# Patient Record
Sex: FEMALE | Race: OTHER | Employment: PART TIME | URBAN - METROPOLITAN AREA
[De-identification: names, ages, dates, MRNs, and addresses within clinical notes are randomized per-mention and may not be internally consistent; named-entity substitution may affect disease eponyms.]

---
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
## 2023-01-09 ENCOUNTER — LAB ENCOUNTER (OUTPATIENT)
Dept: LAB | Facility: HOSPITAL | Age: 23
End: 2023-01-09
Attending: PHYSICIAN ASSISTANT
Payer: COMMERCIAL

## 2023-01-09 PROCEDURE — 36415 COLL VENOUS BLD VENIPUNCTURE: CPT | Performed by: PHYSICIAN ASSISTANT

## 2023-01-09 PROCEDURE — 80053 COMPREHEN METABOLIC PANEL: CPT | Performed by: PHYSICIAN ASSISTANT

## 2023-01-09 PROCEDURE — 80178 ASSAY OF LITHIUM: CPT | Performed by: PHYSICIAN ASSISTANT

## 2025-04-30 ENCOUNTER — TELEPHONE (OUTPATIENT)
Age: 25
End: 2025-04-30

## 2025-04-30 NOTE — TELEPHONE ENCOUNTER
The [facility] Navigation team has attempted to reach you in order to follow up on a request to help get you connected with services. Please give us a call back at [phone] to discuss care coordination and resources.

## 2025-05-18 ENCOUNTER — TELEPHONE (OUTPATIENT)
Age: 25
End: 2025-05-18

## 2025-06-17 ENCOUNTER — ANESTHESIA EVENT (OUTPATIENT)
Dept: SURGERY | Facility: HOSPITAL | Age: 25
End: 2025-06-17
Payer: COMMERCIAL

## 2025-06-17 ENCOUNTER — ANESTHESIA (OUTPATIENT)
Dept: SURGERY | Facility: HOSPITAL | Age: 25
End: 2025-06-17
Payer: COMMERCIAL

## 2025-06-17 ENCOUNTER — HOSPITAL ENCOUNTER (OUTPATIENT)
Facility: HOSPITAL | Age: 25
Discharge: HOME | End: 2025-06-18
Attending: SURGERY | Admitting: SURGERY
Payer: COMMERCIAL

## 2025-06-17 PROBLEM — G89.18 POST-OP PAIN: Status: ACTIVE | Noted: 2025-06-17

## 2025-06-17 LAB — B-HCG UR QL: NEGATIVE

## 2025-06-17 PROCEDURE — 81025 URINE PREGNANCY TEST: CPT

## 2025-06-17 PROCEDURE — 76942 ECHO GUIDE FOR BIOPSY: CPT | Performed by: STUDENT IN AN ORGANIZED HEALTH CARE EDUCATION/TRAINING PROGRAM

## 2025-06-17 PROCEDURE — 88307 TISSUE EXAM BY PATHOLOGIST: CPT | Performed by: SURGERY

## 2025-06-17 DEVICE — BREAST TISSUE EXPANDER, SUTURE TABS, INTEGRAL INJECTION DOME, 350CC
Type: IMPLANTABLE DEVICE | Site: BREAST | Status: FUNCTIONAL
Brand: MENTOR ARTOURA PLUS, SMOOTH, ULTRA HIGH PROFILE

## 2025-06-17 DEVICE — GRAFT HUM TISS THK2-2.8MM THCK M PERF CNTOUR 9.6 X 19.3 CM PC SZ 132 CM: Type: IMPLANTABLE DEVICE | Site: BREAST | Status: FUNCTIONAL

## 2025-06-17 RX ORDER — ATOMOXETINE 60 MG/1
60 CAPSULE ORAL DAILY
Status: DISCONTINUED | OUTPATIENT
Start: 2025-06-18 | End: 2025-06-18

## 2025-06-17 RX ORDER — DROSPIRENONE AND ETHINYL ESTRADIOL 0.02-3(28)
1 KIT ORAL NIGHTLY
Status: DISCONTINUED | OUTPATIENT
Start: 2025-06-17 | End: 2025-06-18

## 2025-06-17 RX ORDER — SCOPOLAMINE 1 MG/3D
PATCH, EXTENDED RELEASE TRANSDERMAL
Status: DISCONTINUED
Start: 2025-06-17 | End: 2025-06-18

## 2025-06-17 RX ORDER — SODIUM CHLORIDE, SODIUM LACTATE, POTASSIUM CHLORIDE, CALCIUM CHLORIDE 600; 310; 30; 20 MG/100ML; MG/100ML; MG/100ML; MG/100ML
INJECTION, SOLUTION INTRAVENOUS CONTINUOUS
Status: DISCONTINUED | OUTPATIENT
Start: 2025-06-17 | End: 2025-06-17 | Stop reason: HOSPADM

## 2025-06-17 RX ORDER — HYDROMORPHONE HYDROCHLORIDE 1 MG/ML
INJECTION, SOLUTION INTRAMUSCULAR; INTRAVENOUS; SUBCUTANEOUS
Status: DISCONTINUED
Start: 2025-06-17 | End: 2025-06-17 | Stop reason: WASHOUT

## 2025-06-17 RX ORDER — PROCHLORPERAZINE EDISYLATE 5 MG/ML
5 INJECTION INTRAMUSCULAR; INTRAVENOUS EVERY 8 HOURS PRN
Status: DISCONTINUED | OUTPATIENT
Start: 2025-06-17 | End: 2025-06-17 | Stop reason: HOSPADM

## 2025-06-17 RX ORDER — ONDANSETRON 2 MG/ML
INJECTION INTRAMUSCULAR; INTRAVENOUS AS NEEDED
Status: DISCONTINUED | OUTPATIENT
Start: 2025-06-17 | End: 2025-06-17 | Stop reason: SURG

## 2025-06-17 RX ORDER — ACETAMINOPHEN 500 MG
1000 TABLET ORAL ONCE
Status: DISCONTINUED | OUTPATIENT
Start: 2025-06-17 | End: 2025-06-17 | Stop reason: HOSPADM

## 2025-06-17 RX ORDER — PHENYLEPHRINE HCL 10 MG/ML
VIAL (ML) INJECTION AS NEEDED
Status: DISCONTINUED | OUTPATIENT
Start: 2025-06-17 | End: 2025-06-17 | Stop reason: SURG

## 2025-06-17 RX ORDER — SCOPOLAMINE 1 MG/3D
1 PATCH, EXTENDED RELEASE TRANSDERMAL ONCE
Status: DISCONTINUED | OUTPATIENT
Start: 2025-06-17 | End: 2025-06-18

## 2025-06-17 RX ORDER — MORPHINE SULFATE 2 MG/ML
2 INJECTION, SOLUTION INTRAMUSCULAR; INTRAVENOUS EVERY 2 HOUR PRN
Status: DISCONTINUED | OUTPATIENT
Start: 2025-06-17 | End: 2025-06-18

## 2025-06-17 RX ORDER — HYDROMORPHONE HYDROCHLORIDE 1 MG/ML
0.6 INJECTION, SOLUTION INTRAMUSCULAR; INTRAVENOUS; SUBCUTANEOUS EVERY 5 MIN PRN
Status: DISCONTINUED | OUTPATIENT
Start: 2025-06-17 | End: 2025-06-17 | Stop reason: HOSPADM

## 2025-06-17 RX ORDER — METHOCARBAMOL 500 MG/1
500 TABLET, FILM COATED ORAL EVERY 12 HOURS
Status: DISCONTINUED | OUTPATIENT
Start: 2025-06-18 | End: 2025-06-18

## 2025-06-17 RX ORDER — LITHIUM CARBONATE 300 MG/1
600 TABLET, FILM COATED, EXTENDED RELEASE ORAL NIGHTLY
Status: DISCONTINUED | OUTPATIENT
Start: 2025-06-18 | End: 2025-06-17

## 2025-06-17 RX ORDER — HYDROCODONE BITARTRATE AND ACETAMINOPHEN 5; 325 MG/1; MG/1
1 TABLET ORAL EVERY 4 HOURS PRN
Status: DISCONTINUED | OUTPATIENT
Start: 2025-06-17 | End: 2025-06-18

## 2025-06-17 RX ORDER — ROCURONIUM BROMIDE 10 MG/ML
INJECTION, SOLUTION INTRAVENOUS AS NEEDED
Status: DISCONTINUED | OUTPATIENT
Start: 2025-06-17 | End: 2025-06-17 | Stop reason: SURG

## 2025-06-17 RX ORDER — ONDANSETRON 2 MG/ML
4 INJECTION INTRAMUSCULAR; INTRAVENOUS EVERY 6 HOURS PRN
Status: DISCONTINUED | OUTPATIENT
Start: 2025-06-17 | End: 2025-06-17 | Stop reason: HOSPADM

## 2025-06-17 RX ORDER — HYDROCODONE BITARTRATE AND ACETAMINOPHEN 5; 325 MG/1; MG/1
1 TABLET ORAL ONCE AS NEEDED
Status: DISCONTINUED | OUTPATIENT
Start: 2025-06-17 | End: 2025-06-17 | Stop reason: HOSPADM

## 2025-06-17 RX ORDER — LITHIUM CARBONATE 300 MG/1
600 TABLET, FILM COATED, EXTENDED RELEASE ORAL NIGHTLY
Status: DISCONTINUED | OUTPATIENT
Start: 2025-06-17 | End: 2025-06-18

## 2025-06-17 RX ORDER — DEXAMETHASONE SODIUM PHOSPHATE 4 MG/ML
VIAL (ML) INJECTION AS NEEDED
Status: DISCONTINUED | OUTPATIENT
Start: 2025-06-17 | End: 2025-06-17 | Stop reason: SURG

## 2025-06-17 RX ORDER — ACETAMINOPHEN 500 MG
1000 TABLET ORAL ONCE AS NEEDED
Status: DISCONTINUED | OUTPATIENT
Start: 2025-06-17 | End: 2025-06-17 | Stop reason: HOSPADM

## 2025-06-17 RX ORDER — MORPHINE SULFATE 4 MG/ML
4 INJECTION, SOLUTION INTRAMUSCULAR; INTRAVENOUS EVERY 2 HOUR PRN
Status: DISCONTINUED | OUTPATIENT
Start: 2025-06-17 | End: 2025-06-18

## 2025-06-17 RX ORDER — HYDROCODONE BITARTRATE AND ACETAMINOPHEN 5; 325 MG/1; MG/1
2 TABLET ORAL EVERY 4 HOURS PRN
Status: DISCONTINUED | OUTPATIENT
Start: 2025-06-17 | End: 2025-06-18

## 2025-06-17 RX ORDER — TRANEXAMIC ACID 10 MG/ML
INJECTION, SOLUTION INTRAVENOUS AS NEEDED
Status: DISCONTINUED | OUTPATIENT
Start: 2025-06-17 | End: 2025-06-17 | Stop reason: SURG

## 2025-06-17 RX ORDER — SODIUM CHLORIDE, SODIUM LACTATE, POTASSIUM CHLORIDE, CALCIUM CHLORIDE 600; 310; 30; 20 MG/100ML; MG/100ML; MG/100ML; MG/100ML
INJECTION, SOLUTION INTRAVENOUS CONTINUOUS
Status: DISCONTINUED | OUTPATIENT
Start: 2025-06-17 | End: 2025-06-18

## 2025-06-17 RX ORDER — METHOCARBAMOL 100 MG/ML
500 INJECTION, SOLUTION INTRAMUSCULAR; INTRAVENOUS EVERY 12 HOURS
Status: DISCONTINUED | OUTPATIENT
Start: 2025-06-17 | End: 2025-06-18

## 2025-06-17 RX ORDER — DULOXETIN HYDROCHLORIDE 30 MG/1
30 CAPSULE, DELAYED RELEASE ORAL DAILY
Status: DISCONTINUED | OUTPATIENT
Start: 2025-06-18 | End: 2025-06-17

## 2025-06-17 RX ORDER — HYDROMORPHONE HYDROCHLORIDE 1 MG/ML
0.4 INJECTION, SOLUTION INTRAMUSCULAR; INTRAVENOUS; SUBCUTANEOUS EVERY 5 MIN PRN
Status: DISCONTINUED | OUTPATIENT
Start: 2025-06-17 | End: 2025-06-17 | Stop reason: HOSPADM

## 2025-06-17 RX ORDER — MORPHINE SULFATE 2 MG/ML
1 INJECTION, SOLUTION INTRAMUSCULAR; INTRAVENOUS EVERY 2 HOUR PRN
Status: DISCONTINUED | OUTPATIENT
Start: 2025-06-17 | End: 2025-06-18

## 2025-06-17 RX ORDER — DULOXETIN HYDROCHLORIDE 30 MG/1
60 CAPSULE, DELAYED RELEASE ORAL DAILY
Status: DISCONTINUED | OUTPATIENT
Start: 2025-06-17 | End: 2025-06-18

## 2025-06-17 RX ORDER — DULOXETIN HYDROCHLORIDE 30 MG/1
30 CAPSULE, DELAYED RELEASE ORAL DAILY
Status: DISCONTINUED | OUTPATIENT
Start: 2025-06-17 | End: 2025-06-18

## 2025-06-17 RX ORDER — HYDROMORPHONE HYDROCHLORIDE 1 MG/ML
0.2 INJECTION, SOLUTION INTRAMUSCULAR; INTRAVENOUS; SUBCUTANEOUS EVERY 5 MIN PRN
Status: DISCONTINUED | OUTPATIENT
Start: 2025-06-17 | End: 2025-06-17 | Stop reason: HOSPADM

## 2025-06-17 RX ORDER — HYDROMORPHONE HYDROCHLORIDE 1 MG/ML
0.6 INJECTION, SOLUTION INTRAMUSCULAR; INTRAVENOUS; SUBCUTANEOUS EVERY 5 MIN PRN
Status: ACTIVE | OUTPATIENT
Start: 2025-06-17 | End: 2025-06-18

## 2025-06-17 RX ORDER — DULOXETIN HYDROCHLORIDE 30 MG/1
60 CAPSULE, DELAYED RELEASE ORAL DAILY
Status: DISCONTINUED | OUTPATIENT
Start: 2025-06-18 | End: 2025-06-17

## 2025-06-17 RX ORDER — NALOXONE HYDROCHLORIDE 0.4 MG/ML
0.08 INJECTION, SOLUTION INTRAMUSCULAR; INTRAVENOUS; SUBCUTANEOUS AS NEEDED
Status: ACTIVE | OUTPATIENT
Start: 2025-06-17 | End: 2025-06-18

## 2025-06-17 RX ORDER — METHOCARBAMOL 100 MG/ML
INJECTION, SOLUTION INTRAMUSCULAR; INTRAVENOUS
Status: COMPLETED
Start: 2025-06-17 | End: 2025-06-17

## 2025-06-17 RX ORDER — FLUTICASONE PROPIONATE 50 MCG
2 SPRAY, SUSPENSION (ML) NASAL DAILY
Status: DISCONTINUED | OUTPATIENT
Start: 2025-06-17 | End: 2025-06-17

## 2025-06-17 RX ORDER — ACETAMINOPHEN 325 MG/1
650 TABLET ORAL EVERY 4 HOURS PRN
Status: DISCONTINUED | OUTPATIENT
Start: 2025-06-17 | End: 2025-06-18

## 2025-06-17 RX ORDER — HYDROMORPHONE HYDROCHLORIDE 1 MG/ML
0.2 INJECTION, SOLUTION INTRAMUSCULAR; INTRAVENOUS; SUBCUTANEOUS EVERY 5 MIN PRN
Status: ACTIVE | OUTPATIENT
Start: 2025-06-17 | End: 2025-06-18

## 2025-06-17 RX ORDER — HYDROCODONE BITARTRATE AND ACETAMINOPHEN 5; 325 MG/1; MG/1
2 TABLET ORAL ONCE AS NEEDED
Status: DISCONTINUED | OUTPATIENT
Start: 2025-06-17 | End: 2025-06-17 | Stop reason: HOSPADM

## 2025-06-17 RX ORDER — NALOXONE HYDROCHLORIDE 0.4 MG/ML
0.08 INJECTION, SOLUTION INTRAMUSCULAR; INTRAVENOUS; SUBCUTANEOUS AS NEEDED
Status: DISCONTINUED | OUTPATIENT
Start: 2025-06-17 | End: 2025-06-17 | Stop reason: HOSPADM

## 2025-06-17 RX ORDER — DIAZEPAM 10 MG/2ML
2.5 INJECTION, SOLUTION INTRAMUSCULAR; INTRAVENOUS ONCE
Status: DISCONTINUED | OUTPATIENT
Start: 2025-06-17 | End: 2025-06-17 | Stop reason: HOSPADM

## 2025-06-17 RX ORDER — CHOLECALCIFEROL (VITAMIN D3) 125 MCG
3000 CAPSULE ORAL
Status: DISCONTINUED | OUTPATIENT
Start: 2025-06-17 | End: 2025-06-18

## 2025-06-17 RX ORDER — HYDROMORPHONE HYDROCHLORIDE 1 MG/ML
0.4 INJECTION, SOLUTION INTRAMUSCULAR; INTRAVENOUS; SUBCUTANEOUS EVERY 5 MIN PRN
Status: ACTIVE | OUTPATIENT
Start: 2025-06-17 | End: 2025-06-18

## 2025-06-17 RX ADMIN — ONDANSETRON 4 MG: 2 INJECTION INTRAMUSCULAR; INTRAVENOUS at 15:40:00

## 2025-06-17 RX ADMIN — ROCURONIUM BROMIDE 50 MG: 10 INJECTION, SOLUTION INTRAVENOUS at 13:20:00

## 2025-06-17 RX ADMIN — TRANEXAMIC ACID 1000 MG: 10 INJECTION, SOLUTION INTRAVENOUS at 14:58:00

## 2025-06-17 RX ADMIN — ROCURONIUM BROMIDE 10 MG: 10 INJECTION, SOLUTION INTRAVENOUS at 14:13:00

## 2025-06-17 RX ADMIN — DEXAMETHASONE SODIUM PHOSPHATE 8 MG: 4 MG/ML VIAL (ML) INJECTION at 13:22:00

## 2025-06-17 RX ADMIN — SODIUM CHLORIDE, SODIUM LACTATE, POTASSIUM CHLORIDE, CALCIUM CHLORIDE: 600; 310; 30; 20 INJECTION, SOLUTION INTRAVENOUS at 16:12:00

## 2025-06-17 RX ADMIN — PHENYLEPHRINE HCL 100 MCG: 10 MG/ML VIAL (ML) INJECTION at 14:41:00

## 2025-06-17 RX ADMIN — ROCURONIUM BROMIDE 10 MG: 10 INJECTION, SOLUTION INTRAVENOUS at 15:05:00

## 2025-06-17 NOTE — BRIEF OP NOTE
Pre-Operative Diagnosis: BRCA POSITIVE     Post-Operative Diagnosis: BRCA POSITIVE      Procedure Performed:   BILATERAL NIPPLE SPARING MASTECTOMY ([place])  B TE reconstruction  Surgeons and Role:  Panel 1:     * Bobby Loredo MD - Primary  Panel 2:     * Luz Marina Shaw MD - Primary    Assistant(s):  Surgical Assistant.: April Lambert CSA  PA: Lissette Morel PA        Estimated Blood Loss: Blood Output: 20 mL (6/17/2025  3:53 PM)    Luz Marina Shaw MD  6/17/2025  3:56 PM

## 2025-06-17 NOTE — ANESTHESIA PROCEDURE NOTES
Airway  Date/Time: 6/17/2025 1:21 PM  Reason: elective    Airway not difficult    General Information and Staff   Patient location during procedure: OR  Anesthesiologist: Kavin Spencer DO  Performed: anesthesiologist   Performed by: Kavin Spencer DO  Authorized by: Kavin Spencer DO        Indications and Patient Condition  Indications for airway management: anesthesia  Sedation level: deep      Preoxygenated: yesPatient position: sniffing    Mask difficulty assessment: 1 - vent by mask    Final Airway Details    Final airway type: endotracheal airway    Successful airway: ETT  Cuffed: yes   Successful intubation technique: direct laryngoscopy  Endotracheal tube insertion site: oral  Blade: Adam  Blade size: #3  ETT size (mm): 7.5    Cormack-Lehane Classification: grade I - full view of glottis  Placement verified by: capnometry   Measured from: lips  ETT to lips (cm): 21  Number of attempts at approach: 1  Ventilation between attempts: none  Number of other approaches attempted: 0    Additional Comments  Dentition, lips, gums intact as preinduction

## 2025-06-17 NOTE — ANESTHESIA PREPROCEDURE EVALUATION
PRE-OP EVALUATION    Patient Name: Nellie Viramontes    Admit Diagnosis: BRCA POSITIVE    Pre-op Diagnosis: BRCA POSITIVE    BILATERAL NIPPLE SPARING MASTECTOMY ([place]) BILATERAL SUBPECTORAL TISSUE EXPANDER PLACEMENT WITH BILATERAL PLACEMENT OF ALLODERM (ERINN)    Anesthesia Procedure: BILATERAL NIPPLE SPARING MASTECTOMY ([place]) BILATERAL SUBPECTORAL TISSUE EXPANDER PLACEMENT WITH BILATERAL PLACEMENT OF ALLODERM (POLYGISELLE) (Bilateral)  .    Surgeons and Role:  Panel 1:     * Bobby Loredo MD - Primary  Panel 2:     * Luz Marina Shaw MD - Primary    Pre-op vitals reviewed.  Temp: 97.9 °F (36.6 °C)  Pulse: 86  Resp: 16  BP: 94/70  SpO2: 100 %  Body mass index is 19.02 kg/m².    Current medications reviewed.  Hospital Medications:  Current Medications[1]    Outpatient Medications:   Prescriptions Prior to Admission[2]    Allergies: Seasonal      Anesthesia Evaluation    Patient summary reviewed.    Anesthetic Complications  (-) history of anesthetic complications         GI/Hepatic/Renal    Negative GI/hepatic/renal ROS.                             Cardiovascular    Negative cardiovascular ROS.  ECG reviewed.  Exercise tolerance: good     MET: >4                                           Endo/Other    Negative endo/other ROS.                              Pulmonary    Negative pulmonary ROS.                       Neuro/Psych  Comment: PTSD  Negative neuro/psych ROS.  (+) depression  (+) anxiety                      Scoliosis           Past Surgical History[3]  Social Hx on file[4]  History   Drug Use No     Available pre-op labs reviewed.               Airway      Mallampati: II  Mouth opening: >3 FB  TM distance: 4 - 6 cm  Neck ROM: full Cardiovascular    Cardiovascular exam normal.  Rhythm: regular  Rate: normal  (-) murmur   Dental    Dentition appears grossly intact         Pulmonary    Pulmonary exam normal.  Breath sounds clear to auscultation bilaterally.               Other  findings              ASA: 3   Plan: general  NPO status verified and patient meets guidelines.  Patient has not taken beta blockers in last 24 hours.  Post-procedure pain management plan discussed with surgeon and patient.      Plan/risks discussed with: patient                Present on Admission:  **None**             [1]    [Transfer Hold] acetaminophen (Tylenol Extra Strength) tab 1,000 mg  1,000 mg Oral Once    scopolamine (Transderm-Scop) 1 MG/3DAYS patch 1 patch  1 patch Transdermal Once    lactated ringers infusion   Intravenous Continuous    ceFAZolin (Ancef) 2g in 10mL IV syringe premix  2 g Intravenous Once    ceFAZolin (Ancef) 2 g/10mL IV syringe premix        ceFAZolin (Ancef) 1 g, gentamicin (Garamycin) 80 mg in sodium chloride 0.9% 1,000 mL irrigation   Irrigation Once (Intra-Op)   [2]   Medications Prior to Admission   Medication Sig Dispense Refill Last Dose/Taking    GABAPENTIN 300 MG Oral Cap MAY TAKE 2 CAPSULES (600 MG TOTAL) BY MOUTH NIGHTLY AS NEEDED. MAY ALSO TAKE 1 CAPSULE (300 MG TOTAL) DAILY AS NEEDED. 90 capsule 0 6/16/2025 at 11:30 PM    DULoxetine 30 MG Oral Cap DR Particles TAKE 30 MG TABLET ALONG WITH 60 MG FOR 90 MG TOTAL DAILY 90 capsule 0 6/16/2025 at 11:30 PM    [START ON 7/1/2025] atomoxetine 60 MG Oral Cap Take 1 capsule (60 mg total) by mouth daily. 90 capsule 0 6/15/2025    [START ON 7/1/2025] traZODone 150 MG Oral Tab Take 1 tablet (150 mg total) by mouth daily as needed for Sleep. (Patient taking differently: Take 1 tablet (150 mg total) by mouth nightly.) 90 tablet 0 6/16/2025 at 11:30 PM    lithium  MG Oral Tab CR Take 2 tablets (600 mg total) by mouth nightly. 60 tablet 2 6/16/2025 at 11:30 PM    DULoxetine 60 MG Oral Cap DR Particles TAKE 1 CAPSULE BY MOUTH EVERY DAY (Patient taking differently: every evening. TAKE 1 CAPSULE BY MOUTH EVERY DAY) 90 capsule 1 6/16/2025 at 11:30 PM    Norethindrone Acet-Ethinyl Est (JUNEL 1/20) 1-20 MG-MCG Oral Tab Take 1 tablet by  mouth nightly. 21 tablet 14 6/16/2025 at 11:30 PM    FLUTICASONE PROPIONATE 50 MCG/ACT Nasal Suspension USE 2 SPRAYS IN EACH NOSTRIL DAILY 48 g 3 Taking    lactase 3000 units Oral Tab Take 1 tablet (3,000 Units total) by mouth daily as needed. As needed with meals and snacks containing dairy/lactose       Cholecalciferol (VITAMIN D) 1000 units Oral Tab Take 5,000 mg by mouth.       Multiple Vitamins-Minerals (MULTIVITAMINS) Oral Chew Tab Chew 1 tablet by mouth in the morning.      [3]   Past Surgical History:  Procedure Laterality Date    Eye surgery  06/2008    lazy eye muscle repair B/L eye  AND IN 2020- had x2 stye's removed    Needle biopsy right      Other surgical history      Revise ulnar nerve at elbow Right 07/09/2019   [4]   Social History  Socioeconomic History    Marital status: Single   Tobacco Use    Smoking status: Never    Smokeless tobacco: Never   Vaping Use    Vaping status: Never Used   Substance and Sexual Activity    Alcohol use: Yes     Comment: socially    Drug use: No

## 2025-06-17 NOTE — PROGRESS NOTES
NURSING ADMISSION NOTE      Patient admitted via BED from PACU  Oriented to room.  Safety precautions initiated.  Bed in low position.  Call light in reach.    Alert & oriented x4.With tolerable pain.No nausea or vomiting.With right and left breast incisions with steristrips,fluffs ,surgical bra --all c/d/I.With 2 sahara chapman drains on each breast to bulb suction with scant serosanguinous drainage..Due to void.HOB elevated at 30 degrees .Arms elevated on pillows.Plan of care discussed with patient.all questions and concerns addressed.     2 person skin assessment done with Azra WILCOX -skin intact.

## 2025-06-17 NOTE — ANESTHESIA POSTPROCEDURE EVALUATION
[facility]    Nellie Viramontes Patient Status:  Hospital Outpatient Surgery   Age/Gender 24 year old female MRN [id number]   Location [facility] Attending Bobby Loredo MD   Hosp Day # 0 PCP Jr Barron MD       Anesthesia Post-op Note    BILATERAL NIPPLE SPARING MASTECTOMY ([place])    Procedure Summary       Date: 06/17/25 Room / Location:  MAIN OR 12 /  MAIN OR    Anesthesia Start: 1309 Anesthesia Stop:     Procedures:       BILATERAL NIPPLE SPARING MASTECTOMY ([place]) (Bilateral: Breast)      BILATERAL PREPECTORAL TISSUE EXPANDER PLACEMENT WITH BILATERAL PLACEMENT OF ALLODERM (ERINN) (Bilateral: Breast) Diagnosis: (BRCA POSITIVE)    Surgeons: Bobby Loredo MD; Luz Marina Shaw MD Anesthesiologist: Kavin Spencer DO    Anesthesia Type: general, regional ASA Status: 3            Anesthesia Type: general, regional    Vitals Value Taken Time   /62 06/17/25 16:10   Temp  06/17/25 16:12   Pulse 89 06/17/25 16:11   Resp 15 06/17/25 16:11   SpO2 99 % 06/17/25 16:11   Vitals shown include unfiled device data.        Patient Location: PACU    Anesthesia Type: general and regional    Airway Patency: patent    Postop Pain Control: adequate    Mental Status: mildly sedated but able to meaningfully participate in the post-anesthesia evaluation    Nausea/Vomiting: none    Cardiopulmonary/Hydration status: stable euvolemic    Complications: no apparent anesthesia related complications    Postop vital signs: stable    Dental Exam: Unchanged from Preop    Patient to be discharged from PACU when criteria met.

## 2025-06-17 NOTE — ANESTHESIA PROCEDURE NOTES
Regional Block    Performed by: Kavin Spencer DO  Authorized by: Kavin Spencer DO      General Information and Staff    Start Time:   Anesthesiologist:  Kavin Spencer DO  Performed by:  Anesthesiologist  Patient Location:  OR      Site Identification: real time ultrasound guided and image stored and retrievable    Block site/laterality marked before start: site marked  Reason for Block: at surgeon's request and post-op pain management    Preanesthetic Checklist: 2 patient identifers, IV checked, risks and benefits discussed, monitors and equipment checked, pre-op evaluation, timeout performed, anesthesia consent, sterile technique used, no prohibitive neurological deficits and no local skin infection at insertion site      Procedure Details    Patient Position:   Prep: ChloraPrep    Monitoring:  Cardiac monitor, continuous pulse ox, blood pressure cuff and heart rate  Block Type:  PEC2 and PEC1  Laterality:  Bilateral  Injection Technique:  Single-shot    Needle    Needle Type:  Short-bevel and echogenic  Needle Localization:  Ultrasound guidance  Reason for Ultrasound Use: appropriate spread of the medication was noted in real time and no ultrasound evidence of intravascular and/or intraneural injection            Assessment    Injection Assessment:  Good spread noted, negative resistance, negative aspiration for heme, incremental injection and low pressure  Heart Rate Change: No    - Patient tolerated block procedure well without evidence of immediate block related complications.     Medications      Additional Comments    Medication:  Bupivacaine 0.25% 10 ml on each side with 2mg PF dexamethasone for PEC 1 and 15 ml on each side for PEC 2

## 2025-06-17 NOTE — H&P
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  She recently underwent a right ultrasound guided core biopsy which reveals a fibroadenoma  She has multiple family members with breast cancer, youngest aged 25  She is interested in bilateral prophylactic mastectomies  Mother present for exam and discussion     Past Medical History:   Diagnosis Date   Anorexia nervosa   Anxiety   BRCA1 positive   BRCA2 positive   Depression   Eczema   OCD (obsessive compulsive disorder)   PTSD (post-traumatic stress disorder)   Scoliosis     Past Surgical History:   Procedure Laterality Date   EYE SURGERY Bilateral 06/2008   lazy eye muscle repair   NEUROPLASTY &/TRANSPOSITION ULNAR NERVE ELBOW Right 07/09/2019     Current Outpatient Medications   Medication Sig Dispense Refill   gabapentin 300 MG Oral Cap Take 600 mg by mouth 3 (three) times daily as needed.   VESTURA 3-0.02 MG Oral Tab Take 1 tablet by mouth daily. 90 tablet 3   cephalexin 500 MG Oral Cap Take 1 capsule (500 mg total) by mouth in the morning, at noon, and at bedtime. 21 capsule 0   atomoxetine 25 MG Oral Cap 25 mg.   phenazopyridine (PYRIDIUM) 200 MG Oral Tab Take 1 tablet (200 mg total) by mouth 3 (three) times daily. after meals 6 tablet 0   DULoxetine 60 MG Oral Cap DR Particles Take 60 mg by mouth daily.   traZODone 100 MG Oral Tab Take 150 mg by mouth nightly as needed. Pt take 150 mg   clonazePAM 0.5 MG Oral Tab Take 0.25-0.5 mg by mouth daily as needed.   Lithium Carbonate  MG Oral Tab CR Take 2 tablets (600 mg total) by mouth nightly. 60 tablet 1   FLUTICASONE PROPIONATE 50 MCG/ACT Nasal Suspension USE 2 SPRAYS IN EACH NOSTRIL DAILY 48 g 3   lactase 3000 units Oral Tab Take 3,000 Units by mouth daily as needed. As needed with meals and snacks containing dairy/lactose   Multiple Vitamins-Minerals (MULTIVITAMINS) Oral Chew Tab Chew 1 tablet by mouth daily.     Seasonal OTHER (SEE  COMMENTS)  Comment:Headache, runny nose, scratchy throat    Family History   Problem Relation Age of Onset   Diabetes Father   No Known Problems Mother   Diabetes Maternal Grandmother   Hypertension Maternal Grandmother   Diabetes Maternal Grandfather   Cancer Maternal Aunt   ovarian   Ovarian Cancer Maternal Aunt 44   Breast Cancer Maternal Aunt 50   Breast Cancer Maternal Aunt 49   Other (Other) Maternal Uncle   scoliosis   Ovarian Cancer Maternal Cousin Female 25   Ovarian Cancer Maternal Cousin Female 25   Breast Cancer Maternal Cousin Female 25   Breast Cancer Maternal Cousin Female 36   Ovarian Cancer Paternal Great-Grandparent   rheumatoid arthritis     Social History  Tobacco Use  Smoking status: Never  Smokeless tobacco: Never  Vaping Use  Vaping status: Never Used  Alcohol use: No  Alcohol/week: 0.0 standard drinks of alcohol  Drug use: No    ROS:    10 point review performed with pertinent positives and negatives per HPI    EXAM:    GENERAL: well developed, well nourished female, in no apparent distress  SKIN: anicteric  HEENT: normocephalic; sclera anicteric  NECK: supple, no JVD  RESPIRATORY: clear to auscultation  CARDIOVASCULAR: RRR  ABDOMEN: normal active BS, soft and no tenderness, no mass  LYMPHATIC: no lymphadenopathy  EXTREMITIES: no cyanosis or edema  BREASTS: no discrete or dominate mass either breast  Axilla negative    BREAST US BIOPSY 1 SITE (CPT=19083)    Addendum Date: 4/4/2025   DATE OF SERVICE: 04.03.2025 PATHOLOGY ADDENDUM Addendum, 4/4/2025 PATHOLOGY: Final pathology of right breast mass states fibroadenoma. This is concordant with imaging findings. RECOMMENDATION: Recommend patient return to high risk screening. Radiology nursing has notified the patient of the results recommendations. PATHOLOGY ADDENDUM ENDS    Result Date: 4/4/2025  DATE OF SERVICE: 04.03.2025 PROCEDURE 4/3/2025 9:56 AM 1. Biopsy of the right breast, percutaneous, automated core needle biopsy, using imaging  guidance. 2. Ultrasound guidance for biopsy, imaging supervision and interpretation. 3. Image guided placement, metallic localization clip, percutaneous, during breast biopsy. 4. Post clip right digital diagnostic mammogram INDICATION: 24 year-old woman with right breast indeterminate mass presents for ultrasound guided core needle biopsy. DESCRIPTION: Written, informed consent was obtained, including discussion of the risks, benefits and alternatives. A time out was performed to verify correct patient and procedure. * Target: Right breast mass 1:00 position 3 cm from the nipple upper inner quadrant. * Approach: Medial. * Anesthesia: Lidocaine without epinephrine. * Needle: 12 G Bard Marquee. * Sample number: 3. * Marker: X. * Marker migration on post-procedure mammogram: Breast Density D-The breasts are extremely dense, which lowers the sensitivity of mammography. Although, due to the posterior medial aspect this is not demonstrated on the CC view. There is no evidence of clip migration. . Specimen was sent to surgical pathology. The patient tolerated the procedure without complication and was given verbal and written instructions regarding post-procedure wound care.     IMPRESSION: 1. Successful right breast ultrasound guided, automated core needle biopsy. 2. Successful X shaped marker clip placement with confirmation by right digital diagnostic mammogram. 3. See addendum for radiology pathology correlation. BI-RADS Final Assessment Category: Post Procedure Mammograms for Marker Placement. Management Recommendation: Assess radiologic/pathologic concordance. [facility] [place], [address]     DIAG MAMM,DIGITAL,UNI RT CLIP [facility] (CPT=77065)    Addendum Date: 4/4/2025   DATE OF SERVICE: 04.03.2025 PATHOLOGY ADDENDUM Addendum, 4/4/2025 PATHOLOGY: Final pathology of right breast mass states fibroadenoma. This is concordant with imaging findings. RECOMMENDATION: Recommend patient return to high risk  screening. Radiology nursing has notified the patient of the results recommendations. PATHOLOGY ADDENDUM ENDS    Result Date: 4/4/2025  DATE OF SERVICE: 04.03.2025 PROCEDURE 4/3/2025 9:56 AM 1. Biopsy of the right breast, percutaneous, automated core needle biopsy, using imaging guidance. 2. Ultrasound guidance for biopsy, imaging supervision and interpretation. 3. Image guided placement, metallic localization clip, percutaneous, during breast biopsy. 4. Post clip right digital diagnostic mammogram INDICATION: 24 year-old woman with right breast indeterminate mass presents for ultrasound guided core needle biopsy. DESCRIPTION: Written, informed consent was obtained, including discussion of the risks, benefits and alternatives. A time out was performed to verify correct patient and procedure. * Target: Right breast mass 1:00 position 3 cm from the nipple upper inner quadrant. * Approach: Medial. * Anesthesia: Lidocaine without epinephrine. * Needle: 12 G Bard Marquee. * Sample number: 3. * Marker: X. * Marker migration on post-procedure mammogram: Breast Density D-The breasts are extremely dense, which lowers the sensitivity of mammography. Although, due to the posterior medial aspect this is not demonstrated on the CC view. There is no evidence of clip migration. . Specimen was sent to surgical pathology. The patient tolerated the procedure without complication and was given verbal and written instructions regarding post-procedure wound care.     IMPRESSION: 1. Successful right breast ultrasound guided, automated core needle biopsy. 2. Successful X shaped marker clip placement with confirmation by right digital diagnostic mammogram. 3. See addendum for radiology pathology correlation. BI-RADS Final Assessment Category: Post Procedure Mammograms for Marker Placement. Management Recommendation: Assess radiologic/pathologic concordance. [facility] [address] [place], [address]     US BREAST BILATERAL LIMITED  (CPT=76642X2)    Result Date: 3/27/2025  DATE OF SERVICE: 03.27.2025 LIMITED BREAST ULTRASOUND CLINICAL INFORMATION: Follow-up of a probably benign right breast mass at 1:00.. Further evaluation of left breast pain. Patient is positive for the BRCA gene mutation. COMPARISON STUDY: Priors dating back to 2023.. TECHNIQUE: Grayscale and power Doppler ultrasound imaging was performed of the area of interest as well as the axilla.. -------------------------------------- FINDINGS: Targeted ultrasound of the right breast was performed. At 1:00, 3 cm from the nipple, there is an oval, circumscribed, hypoechoic, probably mass measuring 0.9 x 0.6 x 0.8 cm. There is a possible intraductal extension. The previously measured 0.7 x 0.4 x 0.7 cm on the July 2024 ultrasound. This corresponds to an enhancing mass noted on the August 2024 MRI. There is no right axillary lymphadenopathy. Targeted ultrasound of the patient's area of left breast pain at 12:00, 3 to 8 cm from the nipple, does not demonstrate any suspicious solid or cystic abnormalities. -------------------------------------- IMPRESSION/RECOMMENDATION: Slight increase in size of a 0.9 cm solid right breast mass at 1:00, 3 cm from the nipple. Recommend ultrasound-guided biopsy. No sonographic findings explaining the patient's left breast pain. Clinical management is recommended. BI-RADS Final Assessment Category 4: Suspicious. Management Recommendation: Biopsy should be performed in the absence of clinical contraindication. Results and recommendations were discussed with the patient at the time of examination. The Department of radiology will coordinate scheduling the biopsy with the patient. ------------------------------------------------------- This was interpreted by Alena Evans M.D.     Reports and images personally reviewed and discussed with her    IMPRESSION:    BRACA 1 carrier    PLAN:    We discussed continued high risk screening vs proceeding with  surgery  She would like to proceed with surgery  She appears to be a good candidate for nipple sparing procedure  Details of surgery reviewed

## 2025-06-18 VITALS
DIASTOLIC BLOOD PRESSURE: 59 MMHG | TEMPERATURE: 98 F | HEART RATE: 73 BPM | RESPIRATION RATE: 16 BRPM | WEIGHT: 110.81 LBS | BODY MASS INDEX: 18.92 KG/M2 | HEIGHT: 64 IN | OXYGEN SATURATION: 100 % | SYSTOLIC BLOOD PRESSURE: 94 MMHG

## 2025-06-18 LAB
ANION GAP SERPL CALC-SCNC: 7 MMOL/L (ref 0–18)
BASOPHILS # BLD AUTO: 0.01 X10(3) UL (ref 0–0.2)
BASOPHILS NFR BLD AUTO: 0.1 %
BUN BLD-MCNC: <5 MG/DL (ref 9–23)
CALCIUM BLD-MCNC: 9.2 MG/DL (ref 8.7–10.6)
CHLORIDE SERPL-SCNC: 106 MMOL/L (ref 98–112)
CO2 SERPL-SCNC: 27 MMOL/L (ref 21–32)
CREAT BLD-MCNC: 0.68 MG/DL (ref 0.55–1.02)
EGFRCR SERPLBLD CKD-EPI 2021: 126 ML/MIN/1.73M2 (ref 60–?)
EOSINOPHIL # BLD AUTO: 0.01 X10(3) UL (ref 0–0.7)
EOSINOPHIL NFR BLD AUTO: 0.1 %
ERYTHROCYTE [DISTWIDTH] IN BLOOD BY AUTOMATED COUNT: 11.7 %
GLUCOSE BLD-MCNC: 104 MG/DL (ref 70–99)
HCT VFR BLD AUTO: 31.3 % (ref 35–48)
HGB BLD-MCNC: 10.6 G/DL (ref 12–16)
IMM GRANULOCYTES # BLD AUTO: 0.02 X10(3) UL (ref 0–1)
IMM GRANULOCYTES NFR BLD: 0.2 %
LYMPHOCYTES # BLD AUTO: 1.32 X10(3) UL (ref 1–4)
LYMPHOCYTES NFR BLD AUTO: 14 %
MAGNESIUM SERPL-MCNC: 1.9 MG/DL (ref 1.6–2.6)
MCH RBC QN AUTO: 31.5 PG (ref 26–34)
MCHC RBC AUTO-ENTMCNC: 33.9 G/DL (ref 31–37)
MCV RBC AUTO: 92.9 FL (ref 80–100)
MONOCYTES # BLD AUTO: 0.69 X10(3) UL (ref 0.1–1)
MONOCYTES NFR BLD AUTO: 7.3 %
NEUTROPHILS # BLD AUTO: 7.35 X10 (3) UL (ref 1.5–7.7)
NEUTROPHILS # BLD AUTO: 7.35 X10(3) UL (ref 1.5–7.7)
NEUTROPHILS NFR BLD AUTO: 78.3 %
PLATELET # BLD AUTO: 244 10(3)UL (ref 150–450)
POTASSIUM SERPL-SCNC: 4.4 MMOL/L (ref 3.5–5.1)
RBC # BLD AUTO: 3.37 X10(6)UL (ref 3.8–5.3)
SODIUM SERPL-SCNC: 140 MMOL/L (ref 136–145)
WBC # BLD AUTO: 9.4 X10(3) UL (ref 4–11)

## 2025-06-18 PROCEDURE — 83735 ASSAY OF MAGNESIUM: CPT | Performed by: INTERNAL MEDICINE

## 2025-06-18 PROCEDURE — 80048 BASIC METABOLIC PNL TOTAL CA: CPT | Performed by: INTERNAL MEDICINE

## 2025-06-18 PROCEDURE — 85025 COMPLETE CBC W/AUTO DIFF WBC: CPT | Performed by: INTERNAL MEDICINE

## 2025-06-18 RX ORDER — GABAPENTIN 300 MG/1
600 CAPSULE ORAL NIGHTLY
Status: DISCONTINUED | OUTPATIENT
Start: 2025-06-18 | End: 2025-06-18

## 2025-06-18 RX ORDER — TRAZODONE HYDROCHLORIDE 150 MG/1
150 TABLET ORAL NIGHTLY
Status: SHIPPED | COMMUNITY
Start: 2025-07-01

## 2025-06-18 RX ORDER — ONDANSETRON 2 MG/ML
4 INJECTION INTRAMUSCULAR; INTRAVENOUS EVERY 6 HOURS PRN
Status: DISCONTINUED | OUTPATIENT
Start: 2025-06-18 | End: 2025-06-18

## 2025-06-18 NOTE — DISCHARGE SUMMARY
[facility] Internal Medicine Hospitalist Discharge Summary     Patient ID:  Nellie Viramontes  24 year old  10/28/2000    Admission Date/Time  6/17/2025 10:59 AM  Discharge Date  06/18/25    PCP  Jr Barron MD     Discharging Hospitalist:  Ignacio Traore DO    Disposition:  home    Follow Up Appointments  No follow-up provider specified.    Primary Hospital Problems/Hospital Course Summary  Bl mastectomy    Medication Changes      Important Follow Up Items  Labs:    Incidental Findings:      Procedures/Diagnostics       Operative Procedures:      Bilateral nipple-sparing mastectomy.  06/17/25    Change in Code Status:        Hospital Course/Secondary Diagnoses:         Patient is a 25 y/o F scoliosis, GERD, MDD/ANNE, borderline personality disorder, OCD, PTSD, BRCA positive who presents postoperatively s/p bilateral nipple sparing mastectomy, B TE reconstruction.     Uncomplicated post op course    Consults: IP CONSULT TO HOSPITALIST    Discharge Medications       Medication List        ASK your doctor about these medications      atomoxetine 60 MG Caps  Commonly known as: Strattera  Take 1 capsule (60 mg total) by mouth daily.  Start taking on: July 1, 2025     * DULoxetine 60 MG Cpep  Commonly known as: Cymbalta  TAKE 1 CAPSULE BY MOUTH EVERY DAY     * DULoxetine 30 MG Cpep  Commonly known as: Cymbalta  TAKE 30 MG TABLET ALONG WITH 60 MG FOR 90 MG TOTAL DAILY     fluticasone propionate 50 MCG/ACT Susp  Commonly known as: Flonase  USE 2 SPRAYS IN EACH NOSTRIL DAILY     gabapentin 300 MG Caps  Commonly known as: Neurontin  MAY TAKE 2 CAPSULES (600 MG TOTAL) BY MOUTH NIGHTLY AS NEEDED. MAY ALSO TAKE 1 CAPSULE (300 MG TOTAL) DAILY AS NEEDED.     lactase 3000 units Tabs  Commonly known as: Lactaid     lithium  MG Tbcr  Commonly known as: LITHOBID  Take 2 tablets (600 mg total) by mouth nightly.     Multivitamins Chew     Norethindrone  Acet-Ethinyl Est 1-20 MG-MCG Tabs  Commonly known as: Junel 1/20  Take 1 tablet by mouth nightly.     traZODone 150 MG Tabs  Commonly known as: Desyrel  Take 1 tablet (150 mg total) by mouth daily as needed for Sleep.  Start taking on: July 1, 2025     Vitamin D 1000 units Tabs           * This list has 2 medication(s) that are the same as other medications prescribed for you. Read the directions carefully, and ask your doctor or other care provider to review them with you.                   Imaging/Diagnostic Reports  No results found.      Patient instructions:           Current Discharge Medication List        CONTINUE these medications which have NOT CHANGED    Details   GABAPENTIN 300 MG Oral Cap MAY TAKE 2 CAPSULES (600 MG TOTAL) BY MOUTH NIGHTLY AS NEEDED. MAY ALSO TAKE 1 CAPSULE (300 MG TOTAL) DAILY AS NEEDED.      !! DULoxetine 30 MG Oral Cap DR Particles TAKE 30 MG TABLET ALONG WITH 60 MG FOR 90 MG TOTAL DAILY      atomoxetine 60 MG Oral Cap Take 1 capsule (60 mg total) by mouth daily.      traZODone 150 MG Oral Tab Take 1 tablet (150 mg total) by mouth daily as needed for Sleep.      lithium  MG Oral Tab CR Take 2 tablets (600 mg total) by mouth nightly.      !! DULoxetine 60 MG Oral Cap DR Particles TAKE 1 CAPSULE BY MOUTH EVERY DAY      Norethindrone Acet-Ethinyl Est (JUNEL 1/20) 1-20 MG-MCG Oral Tab Take 1 tablet by mouth nightly.      FLUTICASONE PROPIONATE 50 MCG/ACT Nasal Suspension USE 2 SPRAYS IN EACH NOSTRIL DAILY      lactase 3000 units Oral Tab Take 1 tablet (3,000 Units total) by mouth daily as needed. As needed with meals and snacks containing dairy/lactose      Cholecalciferol (VITAMIN D) 1000 units Oral Tab Take 5,000 mg by mouth.      Multiple Vitamins-Minerals (MULTIVITAMINS) Oral Chew Tab Chew 1 tablet by mouth in the morning.       !! - Potential duplicate medications found. Please discuss with provider.               Exam on day of discharge:     Vitals:    06/18/25 0741   BP:  94/59   Pulse: 73   Resp: 16   Temp: 98 °F (36.7 °C)     Pain controlled   No nv    Physical Exam:   General: no acute distress  Heart: RRR  Lungs: breathing non labored  Abd: Soft, NT, ND         Total time coordinating care for discharge: less than 30 minutes    Ignacio Traore DO

## 2025-06-18 NOTE — PROGRESS NOTES
[facility]  Progress Note    Nellie Viramontes Patient Status:  Outpatient in a Bed    10/28/2000 MRN [id number]   Location [facility] 3NW-A Attending Bobby Loredo MD   Hosp Day # 0 PCP Jr Barron MD     Subjective:    Patient is tolerating diet  Pain controlled  NATE output charted overnight # 1 20, #2 20 # 3 0 #4 50cc      Objective/Physical Exam:    Vital Signs:  Blood pressure 94/59, pulse 73, temperature 98 °F (36.7 °C), temperature source Oral, resp. rate 16, height 5' 4\" (1.626 m), weight 110 lb 12.8 oz (50.3 kg), last menstrual period 2025, SpO2 100%, not currently breastfeeding.    General:  Alert, orientated x3.  Cooperative.  No apparent distress.  Breast  incisions bilaterally with steri strips intact, nipples viable bilaterally, flaps viable no fluid collections noted  NATE x 4 with ss output     Labs:  Reviewed    Lab Results   Component Value Date    WBC 9.4 2025    HGB 10.6 2025    HCT 31.3 2025    .0 2025    CREATSERUM 0.68 2025    BUN <5 2025     2025    K 4.4 2025     2025    CO2 27.0 2025     2025    CA 9.2 2025    MG 1.9 2025       Problem List:  Problem List[1]    Impression:     23 y/o POD #1 bilateral mastectomy ([place]) [facility]  Tolerating diet, pain controlled    Plan:    Reviewed NATE teaching  Reviewed postop restrictions   Ok for dc home today  F/u in office as scheduled  Medications given to patient by plastic surgery  All questions answered    WILLIAM Choudhury  [facility]  General Surgery  2025         [1]   Patient Active Problem List  Diagnosis    Scoliosis    Lumbago    Postural kyphosis of thoracic region    Midline low back pain without sciatica    Acquired pes planovalgus, left    Planovalgus deformity of foot, acquired, right    Foot pain, bilateral    Chronic bilateral thoracic back pain    Elbow injury,  right, initial encounter    Elbow pain, right    Lesion of right ulnar nerve    Severe episode of recurrent major depressive disorder, without psychotic features (HCC)    Oral contraceptive use    Allergic rhinitis    Lactose intolerance    Secondary amenorrhea    Generalized anxiety disorder    Anorexia nervosa, restricting type    Severe recurrent major depression without psychotic features (HCC)    ANNE (generalized anxiety disorder)    Social anxiety disorder    MDD (major depressive disorder), recurrent episode, severe (HCC)    Major depressive disorder, recurrent severe without psychotic features (HCC)    Obsessive-compulsive disorder    Borderline personality disorder (HCC)    Major depressive disorder, recurrent episode, moderate (HCC)    PTSD (post-traumatic stress disorder)    Post-op pain

## 2025-06-18 NOTE — OPERATIVE REPORT
PREOPERATIVE DIAGNOSIS:  high risk for  breast cancer  POSTOPERATIVE DIAGNOSIS:  same  PROCEDURE:  Bilateral breast reconstruction with tissue expander placement and reinforcement with AlloDerm     IMPLANTS:  On the right side:  AlloDerm Select tissue matrix RTU, lot number [id number], reference number [id number] contour M perforated thick.    AlloDerm Select tissue matrix RTU, lot number [id number], reference number [id number] contour M perforated thick.    [place] Artoura PLUS smooth breast tissue expander with suture tabs, 350 cc, reference number sdc-[id number], serial number [id number].     On the left side:  AlloDerm Select tissue matrix RTU, lot number [id number], reference number [id number] contour M perforated thick.    AlloDerm Select tissue matrix RTU, lot number [id number], reference number [id number] contour M perforated thick.    [place] Artoura PLUS smooth breast tissue expander with suture tabs, 350 cc, reference number SDC-[id number], serial number [id number].    SURGEON: Luz Marina Shaw MD  ASSISTANT: Codie Lambert CSA     Complication: none apparent     ANESTHESIA:  General.      ESTIMATED BLOOD LOSS:  20 mL.    DRAINS: 2 drain for each side, total of 4 drains  FINDINGS: bilateral surgical absence of breasts        INDICATIONS:  The patient is a very pleasant 24 year old year old female at high risk for breast cancer. She was seen, evaluated in the clinic and wished to undergo immediate breast reconstruction with tissue expanders/implants following bilateral mastectomy.  The patient was marked in the upright fashion.  Risks and benefits were discussed with the patient.  Risks discussed include, but are not limited to, bleeding, infection, need for additional procedures, capsular contracture, breast implant associated lymphoma, rupture, asymmetry, scar, mastectomy flap necrosis, use of Alloderm, loss of reconstruction.  Additionally, we discussed increased rate of complications and need for additional  procedures in the setting or radiation.   We discussed both pre and sub pectoral placement of tissue expanders. Informed consent was obtained.       OPERATIVE TECHNIQUE:  The patient was appropriately identified and taken to the operating room.  There, she was positioned on the operating table in the supine fashion.  Antibiotics were given.  SCDs were placed on the bilateral lower extremities.  Following induction of anesthesia, patient was prepped and draped in the usual sterile fashion.  Please refer to Dr. Loredo's note for first portion of the procedure.  I presented to the room following completion of Dr. Loredo's portion.  We prepped the skin and redraped and brought out new instruments on the field. A timeout was performed.  Attention was first turned to the right side.  I inspected the mastectomy flap and this appeared to be viable and appropriate for pre pectoral reconstruction.  At this point, we copiously irrigated the wound with sterile solution, including antibiotic solution.  Hemostasis was obtained with Bovie electrocautery.  Following this, the chest was measured and appropriate AlloDerm and tissue expander selected.  The tissue expander was emptied of air.  The AlloDerm tissue expander construct was then created with 3-0 PDS sutures, bringing out the suture tabs through the AlloDerm.  Copious irrigation of the cavity then ensued with antibiotic solution and betadine solution, and hemostasis achieved with Bovie electrocautery.  Betadine was used to re-prep the skin and the area redraped.  We switched our gloves and introduced the expander inferior medially into the cavity, securing it with 0 PDS.  Two drains were then placed, 1 along the IMF and 1 into the axilla, through separate stab incisions.  Incision was then closed with 3-0 Monocryl for deep dermis and 3-0 Monocryl for the skin closure.  Drains were secured with 3-0 nylon.      Attention was then turned to the left side.  I inspected the  mastectomy flap and this appeared to be viable and appropriate for pre pectoral reconstruction.  I then irrigated the cavity copiously with sterile solution, including antibiotic solution, and hemostasis achieved with Bovie electrocautery.  A tissue expander was then opened and emptied of air, AlloDerm tissue expander construct was then constructed on the back table utilizing 3-0 PDS, suture tabs were brought out through the AlloDerm.  I then once again irrigated the wound with antibiotic solution followed by betadine solution.  We then re-prepped the field with Betadine and redraped and used clean instruments for the next portion. We changed our gloves and introduced the tissue expander medially and inferiorly into the cavity, securing it with 0 PDS.  Two drains were then placed through separate stab incisions, 1 along the IMF and 1 into axilla.  The skin was closed with 3-0 Monocryl for the deep dermis and 3-0 Monocryl for the skin closure.  The drains were secured with 3-0 nylon.  There was no tension on the mastectomy skin following closure.     Sterile dressings were applied followed by a post operative bra. Patient tolerated procedure well, was taken to recovery room following reversal of anesthesia.  All counts were correct x2 at the end of the procedure.

## 2025-06-18 NOTE — PROGRESS NOTES
[facility]  Progress Note    Nellie Viramontes Patient Status:  Outpatient in a Bed    10/28/2000 MRN [id number]   Location [facility] 3NW-A Attending Bobby Loredo MD   Hosp Day # 0 PCP Jr Barron MD     Subjective:  Doing well    Objective/Physical Exam:  General: Alert, orientated x3.  Cooperative.  No apparent distress.  Vital Signs:  Blood pressure 94/59, pulse 73, temperature 98 °F (36.7 °C), temperature source Oral, resp. rate 16, height 5' 4\" (1.626 m), weight 110 lb 12.8 oz (50.3 kg), last menstrual period 2025, SpO2 100%, not currently breastfeeding.  HEENT: Exam is unremarkable.  Without scleral icterus.  Mucous membranes are moist. Oropharynx is clear.  Lungs: Clear to auscultation bilaterally.  Cardiac: Regular rate and rhythm. No murmur.  Chest:  B TE in good position, no signs of infection or fluid collection, minimal, bruising  Extremities:  No lower extremity edema noted.  Without clubbing or cyanosis.    Skin: Normal texture and turgor.  Neurologic: Cranial nerves are grossly intact.  Motor strength and sensory examination is grossly normal.  No focal neurologic deficit.    Labs:  Lab Results   Component Value Date    WBC 9.4 2025    HGB 10.6 2025    HCT 31.3 2025    .0 2025    CREATSERUM 0.68 2025    BUN <5 2025     2025    K 4.4 2025     2025    CO2 27.0 2025     2025    CA 9.2 2025    MG 1.9 2025       Assessment/Plan:  POD 1 B mastectomy and TE placement.  Doing well  Discharge today     Luz Marina Shaw MD  2025  10:18 AM

## 2025-06-18 NOTE — OPERATIVE REPORT
[facility]    PATIENT'S NAME: DEYSI MOHR   ATTENDING PHYSICIAN: Bobby Loredo M.D.   OPERATING PHYSICIAN: Bobby Loredo M.D.   PATIENT ACCOUNT#:   [id number]    LOCATION:  [address] [place] [address]  MEDICAL RECORD #:   [id number]       YOB: 2000  ADMISSION DATE:       06/17/2025      OPERATION DATE:  06/17/2025    OPERATIVE REPORT      PREOPERATIVE DIAGNOSIS:  Breast cancer gene 1 carrier.  POSTOPERATIVE DIAGNOSIS:  Breast cancer gene 1 carrier.  PROCEDURE:  Bilateral nipple-sparing mastectomy.    ASSISTANT:  Lissette Morel PA-C.  She assisted by prepping, draping, retracting throughout the procedure.    ANESTHESIA:  General.    OPERATIVE TECHNIQUE:  The patient brought in the operating room, placed on the operative table in the supine position.  Inhalational anesthesia was provided by the attending anesthesiologist.  The anesthesiologist then did bilateral pectoral blocks under ultrasound.  When this was complete, we placed a Bloom catheter to decompress the bladder, and prepped the chest and axilla bilaterally.  I began the surgery on the patient's right side.  She had been preoperatively marked by Plastic Surgery.  I used inframammary incision for the approach.  On the right side, I incised the lower skin and then carefully elevated the breast mound off the chest wall using the electrocautery.  I had completely mobilized the base of the breast off the chest wall and I began with the separation of the breast from the overlying skin.  With careful countertraction, we were able to remove the entire breast through this incision using the electrocautery and the LigaSure as needed for hemostasis.  I placed a stitch in the axillary tail for orientation.  The entire breast mound was sent to Pathology for histological examination.  The wound was irrigated with Irrisept and packed open, and then I moved on to the left side.  On the left side, I made a similar inframammary incision, elevated  the breast off the chest wall using electrocautery.  I then  the breast from the overlying skin using electrocautery and using the LigaSure for hemostasis as needed.  I did this to the limits of the breast mound.  When the breast was free, I placed a stitch at the axillary tail for orientation.  Both wounds were irrigated with Irrisept.  Both were checked for hemostasis.  Skin flaps appeared viable.  At this time, Dr. Vianney Shaw from Plastic Surgery, she joined the operating team.  She will perform the immediate reconstruction and dictate her portion of procedure separately.  The patient tolerated the procedure well.    Dictated By Bobby Loredo M.D.  d: 06/17/2025 20:46:45  t: 06/17/2025 20:51:34  Job [id number]  [facility]/

## 2025-06-18 NOTE — PLAN OF CARE
A&Ox4. VSS. RA. . Denies chest pain and SOB.   GI: Abdomen soft, nondistended. Passing gas.   Denies nausea.   : Voids.   No pain meds given during shift  Up with standby assist.   Drains: 2 Lt NATE drains to bulb suction, 2 rt NATE drains to bulb suction (C/D/I)- Bloody drainage to both  Incisions: Transverse incision under bilateral breast with steri strips and skin glue (C/D/I)   Diet: Fulls  IVF running per order.  IV abx given   All appropriate safety measures in place. All questions and concerns addressed.

## 2025-06-18 NOTE — DISCHARGE INSTRUCTIONS
Tissue Expander for Breast Reconstruction Post-Operative Instructions   You may not shower or get the operative site wet.  Please perform drain care as instructed in the hospital, drain the drains 2-3 times per day and record output over 24 hours.  Please do not forget to bring the record sheet with you to your appointment.  Please take pain medicine as needed for pain, the first 48 hours you will need narcotic pain medication. Following the first 48 hours at home please try to transition to over the counter pain medication.  Please avoid heavy lifting, anything more than 5 lbs.   Please avoid strenuous activity, and anything that involves pushing or pulling with your arms.  Please do not sleep on your sides. Sleeping in a recliner for the first night may help you to sleep on your back.    Expansion instrucitons    Shower with Bactoshield the night before and morning of each tissue expansion.     You need someone to drive you to your appointment for the first 3 weeks post-operatively or as long as you requiring narcotic pain medicine.   What should my activity level be?     You cannot perform house hold chores or heavy lifting greater than 5 to 10 pounds for 6 weeks post-operatively.     No hot tubs, swimming in lakes while the tissue expander is in place.   Be Aware:     You cannot have an MRI if you have tissue expanders in place.     The tissue expanders may be detected via the security scanners at the   airport. You will need a note from your Doctor if you plan to travel indicating you have tissue expanders in place with [facility].   How do I take care of my incision?     You can shower 48 hours after the last drain tube has removed.     Avoid sun exposure to scars for at least 12 months after your last   surgery.     If sun exposure is unavoidable then always use a sun block with 35 SPF   or higher.     Shower regularly to keep the incisions clean and inspect for signs of   infection.    You can use moisturizer  on the incisions and surrounding skin starting 3 weeks.   How should I expect to feel?     You may experience minor discomfort for the following 12 to 24 hours after each tissue expansion. This discomfort usually subsides 2 to 3 days after each tissue expansion.     The tissue expander may shift after the 1st or 2 nd expansion.     You may experience more discomfort on one side than the other if you   have bilateral tissue expanders placed.     Your posture may change causing you to have some upper and/or mid   back pain as you’re expanded.     Your shoulder range of motion may become stiff requiring continuing to   perform the shoulder exercises during the tissue expansion process to   prevent shoulder stiffness and a frozen shoulder.     You may find it difficult to sleep because you feel tight across the chest   and shoulders.     You may find it difficult to fit into certain types of tight fitting clothing.   You may need to wear oversized shirts until the final exchange of the   tissue expander (s).     You may feel chest persist tightness or heaviness secondary to being   expanded. This usually subsides with time.     The tissue expanders will remain in place for a minimum of 8 weeks after   completing the last tissue expansion. This will allow for the soft tissues (i.e. skin and muscle) to heal and recover from being stretched before the second surgery takes place for the exchange of the tissue expander for a permanent breast implant.     You’ll see your surgeon when we think you have achieve your desired breast size to determine if you need additional expansions and for surgical planning.   How can I treat discomfort?     Ibuprofen (or other NSAIDs) 600 mg by mouth every 6 to 8 hours with food starting the morning of each tissue expansion and continue to take around the clock for 3 to 5 days as needed for discomfort. You can start this 2 weeks after surgery.     If you need additional pain control at night,  you may take the prescribed Vicodin or Norco you were prescribed for pain immediately after surgery.   Will I achieve my desired breast size?     This is determined on an individual basis.     There is no scientific way to determine the exact breast size. It will be   determined by a number of different factors i.e.     How well you tolerate each tissue expansion.     How well your skin reacts to the expansions.     The size of the other breast (if a unilateral tissue expander).     Previous surgeries or amount of scarring     We recommend trying on a desired bra size as you approach your desired   breast size to see how well the bra size fits.   When should I call my doctor?     If you have increasing swelling or bruising.     If swelling and redness persist after a few days.     If you have increased redness along the incision.     If you have severe or increased pain not relieved by medication.     If you have any side effects to mediations; such as, rash, nauseas,   headache, vomiting, etc.     If you have an oral temperature of 100.5 degrees or higher.     If you have any yellow or greenish drainage from the incisions or notice a   foul smell.     If you have bleeding from the incisions that is difficult to control with   light pressure.     If you have loss of feeling or motion.

## 2025-06-18 NOTE — H&P
[facility] Hospitalist Consult Note   PCP: Jr Barron MD  Attending: Dr. Loredo   Reason for Consult: Post operative medical management  Date of surgery: 6/17/25  Surgery: BILATERAL NIPPLE SPARING MASTECTOMY ([place]) B TE reconstruction    History of Present Illness: Patient is a 25 y/o F scoliosis, GERD, MDD/ANNE, borderline personality disorder, OCD, PTSD, BRCA positive who presents postoperatively.  Doing well postoperatively with pain well-controlled.  Tolerating diet and denies any nausea with no shortness of breath.  Mother and boyfriend at bedside.    Review of Systems  Comprehensive ROS reviewed and negative except for what's stated above.     PMH  Past Medical History[1]     PSH  Past Surgical History[2]     ALL:  Allergies[3]     Home Medications:  Medications Taking[4]      Soc Hx  Social History     Tobacco Use    Smoking status: Never    Smokeless tobacco: Never   Substance Use Topics    Alcohol use: Yes     Comment: socially        Fam Hx  Family History[5]      OBJECTIVE:  BP 99/60 (BP Location: Left arm)   Pulse 79   Temp 98.6 °F (37 °C) (Oral)   Resp 18   Ht 5' 4\" (1.626 m)   Wt 110 lb 12.8 oz (50.3 kg)   LMP 05/27/2025   SpO2 99%   BMI 19.02 kg/m²   General: Alert, no acute distress  HEENT: oral mucosa normal   Neck: non tender, no adenopathy   Chest: 4 drains and dressing in place   Lungs: clear to ausculation bilaterally  Heart: Regular rate and rhythm  Abdomen: soft, non tender, non distended   Extremities: No edema  Skin: no new rash, normal color  Neuro: 5/5 strength in bilateral extremities, normal sensations  Psych: appropriate affect     Diagnostic Data:    CBC/Chem  No results for input(s): \"WBC\", \"HGB\", \"MCV\", \"PLT\", \"BAND\", \"INR\" in the last 168 hours.    Invalid input(s): \"LYM#\", \"MONO#\", \"BASOS#\", \"EOSIN#\"    No results for input(s): \"NA\", \"K\", \"CL\", \"CO2\", \"BUN\", \"CREATSERUM\", \"GLU\", \"CA\", \"CAION\", \"MG\", \"PHOS\" in the last 168 hours.    No results for input(s): \"ALT\", \"AST\",  \"ALB\", \"AMYLASE\", \"LIPASE\", \"LDH\" in the last 168 hours.    Invalid input(s): \"ALPHOS\", \"TBIL\", \"DBIL\", \"TPROT\"    No results for input(s): \"TROP\" in the last 168 hours.      Radiology: No results found.      ASSESSMENT / PLAN:   Patient is a 23 y/o F scoliosis, GERD, MDD/ANNE, borderline personality disorder, OCD, PTSD, BRCA positive who presents postoperatively s/p bilateral nipple sparing mastectomy, B TE reconstruction.    POD#0 s/p bilateral nipple sparing mastectomy, B TE reconstruction  BRCA positive  - pain and nausea control  - IVF, advance diet as tolerated   - 4 drains care   - am labs  - PT/OT/SW    MDD/ANNE  Borderline personality disorder  OCD  PTSD  - resume home meds as tolerated   - Will resume lithium and duloxetine tonight  - Will hold trazodone and gabapentin while on IV pain medications    Fluids: post op   Diet: ADAT  DVT prophylaxis: SCDs  Code status: Full    Disposition: post op care     Rafael Boogie MD  [facility] Hospitalist   Answering service [phone]           [1]   Past Medical History:   Anorexia    Anxiety    Borderline personality disorder (HCC)    Depression    Eczema    Esophageal reflux    Hx of motion sickness    OCD (obsessive compulsive disorder)    PTSD (post-traumatic stress disorder)    Scoliosis    Scoliosis   [2]   Past Surgical History:  Procedure Laterality Date    Eye surgery  06/2008    lazy eye muscle repair B/L eye  AND IN 2020- had x2 stye's removed    Needle biopsy right      Other surgical history      Revise ulnar nerve at elbow Right 07/09/2019   [3]   Allergies  Allergen Reactions    Seasonal OTHER (SEE COMMENTS)     Headache, runny nose, scratchy throat   [4]   Outpatient Medications Marked as Taking for the 6/17/25 encounter (Hospital Encounter)   Medication Sig Dispense Refill    GABAPENTIN 300 MG Oral Cap MAY TAKE 2 CAPSULES (600 MG TOTAL) BY MOUTH NIGHTLY AS NEEDED. MAY ALSO TAKE 1 CAPSULE (300 MG TOTAL) DAILY AS NEEDED. 90 capsule 0     DULoxetine 30 MG Oral Cap DR Particles TAKE 30 MG TABLET ALONG WITH 60 MG FOR 90 MG TOTAL DAILY 90 capsule 0    [DISCONTINUED] gabapentin 300 MG Oral Cap May take 2 capsules (600 mg total) by mouth nightly as needed. May also take 1 capsule (300 mg total) daily as needed. 90 capsule 0    [START ON 7/1/2025] atomoxetine 60 MG Oral Cap Take 1 capsule (60 mg total) by mouth daily. 90 capsule 0    [START ON 7/1/2025] traZODone 150 MG Oral Tab Take 1 tablet (150 mg total) by mouth daily as needed for Sleep. (Patient taking differently: Take 1 tablet (150 mg total) by mouth nightly.) 90 tablet 0    lithium  MG Oral Tab CR Take 2 tablets (600 mg total) by mouth nightly. 60 tablet 2    DULoxetine 60 MG Oral Cap DR Particles TAKE 1 CAPSULE BY MOUTH EVERY DAY (Patient taking differently: every evening. TAKE 1 CAPSULE BY MOUTH EVERY DAY) 90 capsule 1    Norethindrone Acet-Ethinyl Est (JUNEL 1/20) 1-20 MG-MCG Oral Tab Take 1 tablet by mouth nightly. 21 tablet 14    FLUTICASONE PROPIONATE 50 MCG/ACT Nasal Suspension USE 2 SPRAYS IN EACH NOSTRIL DAILY 48 g 3   [5]   Family History  Problem Relation Age of Onset    Cancer Maternal Aunt         ovarian    Ovarian Cancer Maternal Aunt 44    Breast Cancer Maternal Aunt 50    Diabetes Father     No Known Problems Mother     Ovarian Cancer Maternal Cousin Female 25    Ovarian Cancer Maternal Cousin Female 25    Breast Cancer Maternal Cousin Female 25    Breast Cancer Maternal Cousin Female 36    Diabetes Maternal Grandmother     Hypertension Maternal Grandmother     Diabetes Maternal Grandfather     Other (Other) Maternal Uncle         scoliosis    Breast Cancer Maternal Aunt 49

## 2025-07-28 RX ORDER — CIPROFLOXACIN 750 MG/1
750 TABLET, FILM COATED ORAL 2 TIMES DAILY
COMMUNITY
End: 2025-07-29

## 2025-07-29 ENCOUNTER — ANESTHESIA EVENT (OUTPATIENT)
Dept: SURGERY | Facility: HOSPITAL | Age: 25
End: 2025-07-29
Payer: COMMERCIAL

## 2025-07-29 ENCOUNTER — HOSPITAL ENCOUNTER (OUTPATIENT)
Facility: HOSPITAL | Age: 25
Setting detail: HOSPITAL OUTPATIENT SURGERY
Discharge: HOME | End: 2025-07-29
Attending: PLASTIC SURGERY | Admitting: PLASTIC SURGERY

## 2025-07-29 ENCOUNTER — ANESTHESIA (OUTPATIENT)
Dept: SURGERY | Facility: HOSPITAL | Age: 25
End: 2025-07-29
Payer: COMMERCIAL

## 2025-07-29 VITALS
HEART RATE: 86 BPM | DIASTOLIC BLOOD PRESSURE: 87 MMHG | RESPIRATION RATE: 16 BRPM | WEIGHT: 114 LBS | SYSTOLIC BLOOD PRESSURE: 111 MMHG | OXYGEN SATURATION: 100 % | TEMPERATURE: 97 F | BODY MASS INDEX: 19.46 KG/M2 | HEIGHT: 64 IN

## 2025-07-29 DIAGNOSIS — G89.18 POST-OP PAIN: Primary | ICD-10-CM

## 2025-07-29 LAB — B-HCG UR QL: NEGATIVE

## 2025-07-29 PROCEDURE — 88305 TISSUE EXAM BY PATHOLOGIST: CPT | Performed by: PLASTIC SURGERY

## 2025-07-29 PROCEDURE — 87075 CULTR BACTERIA EXCEPT BLOOD: CPT | Performed by: PLASTIC SURGERY

## 2025-07-29 PROCEDURE — 87070 CULTURE OTHR SPECIMN AEROBIC: CPT | Performed by: PLASTIC SURGERY

## 2025-07-29 PROCEDURE — 87205 SMEAR GRAM STAIN: CPT | Performed by: PLASTIC SURGERY

## 2025-07-29 PROCEDURE — 81025 URINE PREGNANCY TEST: CPT

## 2025-07-29 RX ORDER — CIPROFLOXACIN 750 MG/1
750 TABLET, FILM COATED ORAL 2 TIMES DAILY
Qty: 14 TABLET | Refills: 0 | Status: SHIPPED | OUTPATIENT
Start: 2025-07-29

## 2025-07-29 RX ORDER — HYDROMORPHONE HYDROCHLORIDE 1 MG/ML
0.2 INJECTION, SOLUTION INTRAMUSCULAR; INTRAVENOUS; SUBCUTANEOUS EVERY 5 MIN PRN
Status: DISCONTINUED | OUTPATIENT
Start: 2025-07-29 | End: 2025-07-29

## 2025-07-29 RX ORDER — SODIUM CHLORIDE, SODIUM LACTATE, POTASSIUM CHLORIDE, CALCIUM CHLORIDE 600; 310; 30; 20 MG/100ML; MG/100ML; MG/100ML; MG/100ML
INJECTION, SOLUTION INTRAVENOUS CONTINUOUS
Status: DISCONTINUED | OUTPATIENT
Start: 2025-07-29 | End: 2025-07-29

## 2025-07-29 RX ORDER — MIDAZOLAM HYDROCHLORIDE 1 MG/ML
1 INJECTION INTRAMUSCULAR; INTRAVENOUS EVERY 5 MIN PRN
Status: DISCONTINUED | OUTPATIENT
Start: 2025-07-29 | End: 2025-07-29

## 2025-07-29 RX ORDER — HYDROCODONE BITARTRATE AND ACETAMINOPHEN 5; 325 MG/1; MG/1
1 TABLET ORAL ONCE AS NEEDED
Status: COMPLETED | OUTPATIENT
Start: 2025-07-29 | End: 2025-07-29

## 2025-07-29 RX ORDER — HYDROMORPHONE HYDROCHLORIDE 1 MG/ML
0.6 INJECTION, SOLUTION INTRAMUSCULAR; INTRAVENOUS; SUBCUTANEOUS EVERY 5 MIN PRN
Status: DISCONTINUED | OUTPATIENT
Start: 2025-07-29 | End: 2025-07-29

## 2025-07-29 RX ORDER — HYDROCODONE BITARTRATE AND ACETAMINOPHEN 5; 325 MG/1; MG/1
2 TABLET ORAL ONCE AS NEEDED
Status: COMPLETED | OUTPATIENT
Start: 2025-07-29 | End: 2025-07-29

## 2025-07-29 RX ORDER — OXYCODONE AND ACETAMINOPHEN 5; 325 MG/1; MG/1
1-2 TABLET ORAL EVERY 4 HOURS PRN
Qty: 15 TABLET | Refills: 0 | Status: SHIPPED | OUTPATIENT
Start: 2025-07-29

## 2025-07-29 RX ORDER — LIDOCAINE HYDROCHLORIDE AND EPINEPHRINE 10; 10 MG/ML; UG/ML
INJECTION, SOLUTION INFILTRATION; PERINEURAL AS NEEDED
Status: DISCONTINUED | OUTPATIENT
Start: 2025-07-29 | End: 2025-07-29

## 2025-07-29 RX ORDER — MIDAZOLAM HYDROCHLORIDE 1 MG/ML
INJECTION INTRAMUSCULAR; INTRAVENOUS AS NEEDED
Status: DISCONTINUED | OUTPATIENT
Start: 2025-07-29 | End: 2025-07-29 | Stop reason: SURG

## 2025-07-29 RX ORDER — ACETAMINOPHEN 500 MG
1000 TABLET ORAL ONCE
Status: DISCONTINUED | OUTPATIENT
Start: 2025-07-29 | End: 2025-07-29

## 2025-07-29 RX ORDER — HYDROMORPHONE HYDROCHLORIDE 1 MG/ML
0.4 INJECTION, SOLUTION INTRAMUSCULAR; INTRAVENOUS; SUBCUTANEOUS EVERY 5 MIN PRN
Status: DISCONTINUED | OUTPATIENT
Start: 2025-07-29 | End: 2025-07-29

## 2025-07-29 RX ORDER — ACETAMINOPHEN 500 MG
1000 TABLET ORAL ONCE AS NEEDED
Status: COMPLETED | OUTPATIENT
Start: 2025-07-29 | End: 2025-07-29

## 2025-07-29 RX ORDER — SCOPOLAMINE 1 MG/3D
1 PATCH, EXTENDED RELEASE TRANSDERMAL ONCE
Status: DISCONTINUED | OUTPATIENT
Start: 2025-07-29 | End: 2025-07-29

## 2025-07-29 RX ORDER — NALOXONE HYDROCHLORIDE 0.4 MG/ML
0.08 INJECTION, SOLUTION INTRAMUSCULAR; INTRAVENOUS; SUBCUTANEOUS AS NEEDED
Status: DISCONTINUED | OUTPATIENT
Start: 2025-07-29 | End: 2025-07-29

## 2025-07-29 RX ORDER — ONDANSETRON 2 MG/ML
4 INJECTION INTRAMUSCULAR; INTRAVENOUS EVERY 6 HOURS PRN
Status: DISCONTINUED | OUTPATIENT
Start: 2025-07-29 | End: 2025-07-29

## 2025-07-29 RX ORDER — LIDOCAINE HYDROCHLORIDE 10 MG/ML
INJECTION, SOLUTION EPIDURAL; INFILTRATION; INTRACAUDAL; PERINEURAL AS NEEDED
Status: DISCONTINUED | OUTPATIENT
Start: 2025-07-29 | End: 2025-07-29 | Stop reason: SURG

## 2025-07-29 RX ADMIN — MIDAZOLAM HYDROCHLORIDE 2 MG: 1 INJECTION INTRAMUSCULAR; INTRAVENOUS at 17:23:00

## 2025-07-29 RX ADMIN — SODIUM CHLORIDE, SODIUM LACTATE, POTASSIUM CHLORIDE, CALCIUM CHLORIDE: 600; 310; 30; 20 INJECTION, SOLUTION INTRAVENOUS at 17:32:00

## 2025-07-29 RX ADMIN — LIDOCAINE HYDROCHLORIDE 50 MG: 10 INJECTION, SOLUTION EPIDURAL; INFILTRATION; INTRACAUDAL; PERINEURAL at 17:32:00

## 2025-08-26 ENCOUNTER — HOSPITAL ENCOUNTER (INPATIENT)
Facility: HOSPITAL | Age: 25
LOS: 2 days | Discharge: HOME | End: 2025-08-29
Attending: EMERGENCY MEDICINE | Admitting: HOSPITALIST

## 2025-08-26 DIAGNOSIS — G89.18 POST-OPERATIVE PAIN: ICD-10-CM

## 2025-08-26 DIAGNOSIS — S21.001S BREAST WOUND, RIGHT, SEQUELA: Primary | ICD-10-CM

## 2025-08-26 DIAGNOSIS — G89.18 POST-OP PAIN: ICD-10-CM

## 2025-08-26 PROBLEM — S21.009A BREAST WOUND: Status: ACTIVE | Noted: 2025-08-26

## 2025-08-26 LAB
ALBUMIN SERPL-MCNC: 4.7 G/DL (ref 3.2–4.8)
ALBUMIN/GLOB SERPL: 1.7 (ref 1–2)
ALP LIVER SERPL-CCNC: 76 U/L (ref 52–144)
ALT SERPL-CCNC: 8 U/L (ref 10–49)
ANION GAP SERPL CALC-SCNC: 12 MMOL/L (ref 0–18)
AST SERPL-CCNC: 14 U/L (ref ?–34)
BASOPHILS # BLD AUTO: 0.04 X10(3) UL (ref 0–0.2)
BASOPHILS NFR BLD AUTO: 0.5 %
BILIRUB SERPL-MCNC: 0.2 MG/DL (ref 0.3–1.2)
BUN BLD-MCNC: 12 MG/DL (ref 9–23)
CALCIUM BLD-MCNC: 9.9 MG/DL (ref 8.7–10.6)
CHLORIDE SERPL-SCNC: 103 MMOL/L (ref 98–112)
CO2 SERPL-SCNC: 23 MMOL/L (ref 21–32)
CREAT BLD-MCNC: 0.68 MG/DL (ref 0.55–1.02)
EGFRCR SERPLBLD CKD-EPI 2021: 126 ML/MIN/1.73M2 (ref 60–?)
EOSINOPHIL # BLD AUTO: 0.37 X10(3) UL (ref 0–0.7)
EOSINOPHIL NFR BLD AUTO: 4.9 %
ERYTHROCYTE [DISTWIDTH] IN BLOOD BY AUTOMATED COUNT: 12.4 %
GLOBULIN PLAS-MCNC: 2.7 G/DL (ref 2–3.5)
GLUCOSE BLD-MCNC: 97 MG/DL (ref 70–99)
HCT VFR BLD AUTO: 37.4 % (ref 35–48)
HGB BLD-MCNC: 12 G/DL (ref 12–16)
IMM GRANULOCYTES # BLD AUTO: 0.03 X10(3) UL (ref 0–1)
IMM GRANULOCYTES NFR BLD: 0.4 %
LACTATE SERPL-SCNC: 0.8 MMOL/L (ref 0.5–2)
LYMPHOCYTES # BLD AUTO: 1.98 X10(3) UL (ref 1–4)
LYMPHOCYTES NFR BLD AUTO: 26.3 %
MCH RBC QN AUTO: 29.7 PG (ref 26–34)
MCHC RBC AUTO-ENTMCNC: 32.1 G/DL (ref 31–37)
MCV RBC AUTO: 92.6 FL (ref 80–100)
MONOCYTES # BLD AUTO: 0.4 X10(3) UL (ref 0.1–1)
MONOCYTES NFR BLD AUTO: 5.3 %
NEUTROPHILS # BLD AUTO: 4.71 X10 (3) UL (ref 1.5–7.7)
NEUTROPHILS # BLD AUTO: 4.71 X10(3) UL (ref 1.5–7.7)
NEUTROPHILS NFR BLD AUTO: 62.6 %
OSMOLALITY SERPL CALC.SUM OF ELEC: 286 MOSM/KG (ref 275–295)
PLATELET # BLD AUTO: 300 10(3)UL (ref 150–450)
POTASSIUM SERPL-SCNC: 4.1 MMOL/L (ref 3.5–5.1)
PROT SERPL-MCNC: 7.4 G/DL (ref 5.7–8.2)
RBC # BLD AUTO: 4.04 X10(6)UL (ref 3.8–5.3)
SODIUM SERPL-SCNC: 138 MMOL/L (ref 136–145)
WBC # BLD AUTO: 7.5 X10(3) UL (ref 4–11)

## 2025-08-26 PROCEDURE — 80053 COMPREHEN METABOLIC PANEL: CPT | Performed by: EMERGENCY MEDICINE

## 2025-08-26 PROCEDURE — 99285 EMERGENCY DEPT VISIT HI MDM: CPT

## 2025-08-26 PROCEDURE — 96365 THER/PROPH/DIAG IV INF INIT: CPT

## 2025-08-26 PROCEDURE — 85025 COMPLETE CBC W/AUTO DIFF WBC: CPT | Performed by: EMERGENCY MEDICINE

## 2025-08-26 PROCEDURE — 85025 COMPLETE CBC W/AUTO DIFF WBC: CPT

## 2025-08-26 PROCEDURE — 87040 BLOOD CULTURE FOR BACTERIA: CPT | Performed by: EMERGENCY MEDICINE

## 2025-08-26 PROCEDURE — 80053 COMPREHEN METABOLIC PANEL: CPT

## 2025-08-26 PROCEDURE — 83605 ASSAY OF LACTIC ACID: CPT

## 2025-08-26 PROCEDURE — 83605 ASSAY OF LACTIC ACID: CPT | Performed by: EMERGENCY MEDICINE

## 2025-08-26 PROCEDURE — 36415 COLL VENOUS BLD VENIPUNCTURE: CPT

## 2025-08-26 RX ORDER — ACETAMINOPHEN 500 MG
1000 TABLET ORAL ONCE
Status: DISCONTINUED | OUTPATIENT
Start: 2025-08-26 | End: 2025-08-26

## 2025-08-26 RX ORDER — ACETAMINOPHEN 500 MG
1000 TABLET ORAL ONCE
Status: COMPLETED | OUTPATIENT
Start: 2025-08-26 | End: 2025-08-26

## 2025-08-27 ENCOUNTER — ANESTHESIA EVENT (OUTPATIENT)
Dept: SURGERY | Facility: HOSPITAL | Age: 25
End: 2025-08-27

## 2025-08-27 PROBLEM — N61.0 BREAST INFECTION: Status: ACTIVE | Noted: 2025-08-27

## 2025-08-27 LAB
ANION GAP SERPL CALC-SCNC: 8 MMOL/L (ref 0–18)
BASOPHILS # BLD AUTO: 0.04 X10(3) UL (ref 0–0.2)
BASOPHILS NFR BLD AUTO: 0.7 %
BUN BLD-MCNC: 10 MG/DL (ref 9–23)
CALCIUM BLD-MCNC: 9.5 MG/DL (ref 8.7–10.6)
CHLORIDE SERPL-SCNC: 106 MMOL/L (ref 98–112)
CO2 SERPL-SCNC: 28 MMOL/L (ref 21–32)
CREAT BLD-MCNC: 0.65 MG/DL (ref 0.55–1.02)
EGFRCR SERPLBLD CKD-EPI 2021: 128 ML/MIN/1.73M2 (ref 60–?)
EOSINOPHIL # BLD AUTO: 0.35 X10(3) UL (ref 0–0.7)
EOSINOPHIL NFR BLD AUTO: 6.2 %
ERYTHROCYTE [DISTWIDTH] IN BLOOD BY AUTOMATED COUNT: 12.3 %
GLUCOSE BLD-MCNC: 86 MG/DL (ref 70–99)
HCT VFR BLD AUTO: 32.7 % (ref 35–48)
HGB BLD-MCNC: 10.6 G/DL (ref 12–16)
IMM GRANULOCYTES # BLD AUTO: 0.01 X10(3) UL (ref 0–1)
IMM GRANULOCYTES NFR BLD: 0.2 %
LYMPHOCYTES # BLD AUTO: 2.38 X10(3) UL (ref 1–4)
LYMPHOCYTES NFR BLD AUTO: 42.1 %
MAGNESIUM SERPL-MCNC: 2 MG/DL (ref 1.6–2.6)
MCH RBC QN AUTO: 29.4 PG (ref 26–34)
MCHC RBC AUTO-ENTMCNC: 32.4 G/DL (ref 31–37)
MCV RBC AUTO: 90.6 FL (ref 80–100)
MONOCYTES # BLD AUTO: 0.31 X10(3) UL (ref 0.1–1)
MONOCYTES NFR BLD AUTO: 5.5 %
NEUTROPHILS # BLD AUTO: 2.56 X10 (3) UL (ref 1.5–7.7)
NEUTROPHILS # BLD AUTO: 2.56 X10(3) UL (ref 1.5–7.7)
NEUTROPHILS NFR BLD AUTO: 45.3 %
OSMOLALITY SERPL CALC.SUM OF ELEC: 292 MOSM/KG (ref 275–295)
PLATELET # BLD AUTO: 264 10(3)UL (ref 150–450)
POTASSIUM SERPL-SCNC: 4.1 MMOL/L (ref 3.5–5.1)
RBC # BLD AUTO: 3.61 X10(6)UL (ref 3.8–5.3)
SODIUM SERPL-SCNC: 142 MMOL/L (ref 136–145)
WBC # BLD AUTO: 5.7 X10(3) UL (ref 4–11)

## 2025-08-27 PROCEDURE — 83735 ASSAY OF MAGNESIUM: CPT | Performed by: INTERNAL MEDICINE

## 2025-08-27 PROCEDURE — 80048 BASIC METABOLIC PNL TOTAL CA: CPT | Performed by: INTERNAL MEDICINE

## 2025-08-27 PROCEDURE — 85025 COMPLETE CBC W/AUTO DIFF WBC: CPT | Performed by: INTERNAL MEDICINE

## 2025-08-27 RX ORDER — DULOXETIN HYDROCHLORIDE 30 MG/1
90 CAPSULE, DELAYED RELEASE ORAL NIGHTLY
Status: DISCONTINUED | OUTPATIENT
Start: 2025-08-27 | End: 2025-08-29

## 2025-08-27 RX ORDER — ONDANSETRON 2 MG/ML
4 INJECTION INTRAMUSCULAR; INTRAVENOUS EVERY 6 HOURS PRN
Status: DISCONTINUED | OUTPATIENT
Start: 2025-08-27 | End: 2025-08-29

## 2025-08-27 RX ORDER — LITHIUM CARBONATE 300 MG/1
600 TABLET, FILM COATED, EXTENDED RELEASE ORAL NIGHTLY
Status: DISCONTINUED | OUTPATIENT
Start: 2025-08-27 | End: 2025-08-29

## 2025-08-27 RX ORDER — SODIUM CHLORIDE 9 MG/ML
INJECTION, SOLUTION INTRAVENOUS CONTINUOUS
Status: DISCONTINUED | OUTPATIENT
Start: 2025-08-27 | End: 2025-08-29

## 2025-08-27 RX ORDER — HEPARIN SODIUM 5000 [USP'U]/ML
5000 INJECTION, SOLUTION INTRAVENOUS; SUBCUTANEOUS EVERY 8 HOURS SCHEDULED
Status: DISCONTINUED | OUTPATIENT
Start: 2025-08-27 | End: 2025-08-29

## 2025-08-27 RX ORDER — ACETAMINOPHEN 325 MG/1
650 TABLET ORAL EVERY 4 HOURS PRN
Status: DISCONTINUED | OUTPATIENT
Start: 2025-08-27 | End: 2025-08-28

## 2025-08-27 RX ORDER — HYDROCODONE BITARTRATE AND ACETAMINOPHEN 5; 325 MG/1; MG/1
2 TABLET ORAL EVERY 4 HOURS PRN
Status: DISCONTINUED | OUTPATIENT
Start: 2025-08-27 | End: 2025-08-28

## 2025-08-27 RX ORDER — GABAPENTIN 300 MG/1
600 CAPSULE ORAL NIGHTLY
Status: DISCONTINUED | OUTPATIENT
Start: 2025-08-27 | End: 2025-08-29

## 2025-08-27 RX ORDER — SODIUM PHOSPHATE, DIBASIC AND SODIUM PHOSPHATE, MONOBASIC 7; 19 G/230ML; G/230ML
1 ENEMA RECTAL ONCE AS NEEDED
Status: DISCONTINUED | OUTPATIENT
Start: 2025-08-27 | End: 2025-08-29

## 2025-08-27 RX ORDER — PROCHLORPERAZINE EDISYLATE 5 MG/ML
5 INJECTION INTRAMUSCULAR; INTRAVENOUS EVERY 8 HOURS PRN
Status: DISCONTINUED | OUTPATIENT
Start: 2025-08-27 | End: 2025-08-29

## 2025-08-27 RX ORDER — POLYETHYLENE GLYCOL 3350 17 G/17G
17 POWDER, FOR SOLUTION ORAL DAILY PRN
Status: DISCONTINUED | OUTPATIENT
Start: 2025-08-27 | End: 2025-08-29

## 2025-08-27 RX ORDER — SENNOSIDES 8.6 MG
17.2 TABLET ORAL NIGHTLY PRN
Status: DISCONTINUED | OUTPATIENT
Start: 2025-08-27 | End: 2025-08-29

## 2025-08-27 RX ORDER — BISACODYL 10 MG
10 SUPPOSITORY, RECTAL RECTAL
Status: DISCONTINUED | OUTPATIENT
Start: 2025-08-27 | End: 2025-08-29

## 2025-08-27 RX ORDER — BENZONATATE 100 MG/1
200 CAPSULE ORAL 3 TIMES DAILY PRN
Status: DISCONTINUED | OUTPATIENT
Start: 2025-08-27 | End: 2025-08-29

## 2025-08-27 RX ORDER — HYDROCODONE BITARTRATE AND ACETAMINOPHEN 5; 325 MG/1; MG/1
1 TABLET ORAL EVERY 4 HOURS PRN
Status: DISCONTINUED | OUTPATIENT
Start: 2025-08-27 | End: 2025-08-28

## 2025-08-28 ENCOUNTER — ANESTHESIA (OUTPATIENT)
Dept: SURGERY | Facility: HOSPITAL | Age: 25
End: 2025-08-28

## 2025-08-28 LAB
ANION GAP SERPL CALC-SCNC: 6 MMOL/L (ref 0–18)
BASOPHILS # BLD AUTO: 0.03 X10(3) UL (ref 0–0.2)
BASOPHILS NFR BLD AUTO: 0.6 %
BUN BLD-MCNC: 10 MG/DL (ref 9–23)
CALCIUM BLD-MCNC: 9.4 MG/DL (ref 8.7–10.6)
CHLORIDE SERPL-SCNC: 109 MMOL/L (ref 98–112)
CO2 SERPL-SCNC: 28 MMOL/L (ref 21–32)
CREAT BLD-MCNC: 0.66 MG/DL (ref 0.55–1.02)
EGFRCR SERPLBLD CKD-EPI 2021: 127 ML/MIN/1.73M2 (ref 60–?)
EOSINOPHIL # BLD AUTO: 0.31 X10(3) UL (ref 0–0.7)
EOSINOPHIL NFR BLD AUTO: 5.8 %
ERYTHROCYTE [DISTWIDTH] IN BLOOD BY AUTOMATED COUNT: 12.1 %
GLUCOSE BLD-MCNC: 92 MG/DL (ref 70–99)
HCG UR QL: NEGATIVE
HCT VFR BLD AUTO: 32.3 % (ref 35–48)
HGB BLD-MCNC: 10.6 G/DL (ref 12–16)
IMM GRANULOCYTES # BLD AUTO: 0.02 X10(3) UL (ref 0–1)
IMM GRANULOCYTES NFR BLD: 0.4 %
LYMPHOCYTES # BLD AUTO: 2.29 X10(3) UL (ref 1–4)
LYMPHOCYTES NFR BLD AUTO: 43.2 %
MAGNESIUM SERPL-MCNC: 2 MG/DL (ref 1.6–2.6)
MCH RBC QN AUTO: 29.6 PG (ref 26–34)
MCHC RBC AUTO-ENTMCNC: 32.8 G/DL (ref 31–37)
MCV RBC AUTO: 90.2 FL (ref 80–100)
MONOCYTES # BLD AUTO: 0.36 X10(3) UL (ref 0.1–1)
MONOCYTES NFR BLD AUTO: 6.8 %
NEUTROPHILS # BLD AUTO: 2.29 X10 (3) UL (ref 1.5–7.7)
NEUTROPHILS # BLD AUTO: 2.29 X10(3) UL (ref 1.5–7.7)
NEUTROPHILS NFR BLD AUTO: 43.2 %
OSMOLALITY SERPL CALC.SUM OF ELEC: 295 MOSM/KG (ref 275–295)
PLATELET # BLD AUTO: 255 10(3)UL (ref 150–450)
POTASSIUM SERPL-SCNC: 4.4 MMOL/L (ref 3.5–5.1)
RBC # BLD AUTO: 3.58 X10(6)UL (ref 3.8–5.3)
SODIUM SERPL-SCNC: 143 MMOL/L (ref 136–145)
WBC # BLD AUTO: 5.3 X10(3) UL (ref 4–11)

## 2025-08-28 PROCEDURE — 87186 SC STD MICRODIL/AGAR DIL: CPT | Performed by: PLASTIC SURGERY

## 2025-08-28 PROCEDURE — 87206 SMEAR FLUORESCENT/ACID STAI: CPT | Performed by: PLASTIC SURGERY

## 2025-08-28 PROCEDURE — 87075 CULTR BACTERIA EXCEPT BLOOD: CPT | Performed by: PLASTIC SURGERY

## 2025-08-28 PROCEDURE — 76942 ECHO GUIDE FOR BIOPSY: CPT | Performed by: ANESTHESIOLOGY

## 2025-08-28 PROCEDURE — 87116 MYCOBACTERIA CULTURE: CPT | Performed by: PLASTIC SURGERY

## 2025-08-28 PROCEDURE — 87205 SMEAR GRAM STAIN: CPT | Performed by: PLASTIC SURGERY

## 2025-08-28 PROCEDURE — 80048 BASIC METABOLIC PNL TOTAL CA: CPT | Performed by: INTERNAL MEDICINE

## 2025-08-28 PROCEDURE — 87070 CULTURE OTHR SPECIMN AEROBIC: CPT | Performed by: PLASTIC SURGERY

## 2025-08-28 PROCEDURE — 85025 COMPLETE CBC W/AUTO DIFF WBC: CPT | Performed by: INTERNAL MEDICINE

## 2025-08-28 PROCEDURE — 83735 ASSAY OF MAGNESIUM: CPT | Performed by: INTERNAL MEDICINE

## 2025-08-28 PROCEDURE — 87077 CULTURE AEROBIC IDENTIFY: CPT | Performed by: PLASTIC SURGERY

## 2025-08-28 PROCEDURE — 87176 TISSUE HOMOGENIZATION CULTR: CPT | Performed by: PLASTIC SURGERY

## 2025-08-28 PROCEDURE — 81025 URINE PREGNANCY TEST: CPT | Performed by: PLASTIC SURGERY

## 2025-08-28 RX ORDER — NEOSTIGMINE METHYLSULFATE 1 MG/ML
INJECTION INTRAVENOUS AS NEEDED
Status: DISCONTINUED | OUTPATIENT
Start: 2025-08-28 | End: 2025-08-28 | Stop reason: SURG

## 2025-08-28 RX ORDER — PROCHLORPERAZINE EDISYLATE 5 MG/ML
5 INJECTION INTRAMUSCULAR; INTRAVENOUS EVERY 8 HOURS PRN
Status: DISCONTINUED | OUTPATIENT
Start: 2025-08-28 | End: 2025-08-28 | Stop reason: HOSPADM

## 2025-08-28 RX ORDER — MEPERIDINE HYDROCHLORIDE 25 MG/ML
12.5 INJECTION INTRAMUSCULAR; INTRAVENOUS; SUBCUTANEOUS AS NEEDED
Status: DISCONTINUED | OUTPATIENT
Start: 2025-08-28 | End: 2025-08-28 | Stop reason: HOSPADM

## 2025-08-28 RX ORDER — HYDROMORPHONE HYDROCHLORIDE 1 MG/ML
0.4 INJECTION, SOLUTION INTRAMUSCULAR; INTRAVENOUS; SUBCUTANEOUS EVERY 5 MIN PRN
Status: DISCONTINUED | OUTPATIENT
Start: 2025-08-28 | End: 2025-08-28 | Stop reason: HOSPADM

## 2025-08-28 RX ORDER — HYDROMORPHONE HYDROCHLORIDE 1 MG/ML
0.6 INJECTION, SOLUTION INTRAMUSCULAR; INTRAVENOUS; SUBCUTANEOUS EVERY 5 MIN PRN
Status: DISCONTINUED | OUTPATIENT
Start: 2025-08-28 | End: 2025-08-28 | Stop reason: HOSPADM

## 2025-08-28 RX ORDER — ROCURONIUM BROMIDE 10 MG/ML
INJECTION, SOLUTION INTRAVENOUS AS NEEDED
Status: DISCONTINUED | OUTPATIENT
Start: 2025-08-28 | End: 2025-08-28 | Stop reason: SURG

## 2025-08-28 RX ORDER — SODIUM CHLORIDE, SODIUM LACTATE, POTASSIUM CHLORIDE, CALCIUM CHLORIDE 600; 310; 30; 20 MG/100ML; MG/100ML; MG/100ML; MG/100ML
INJECTION, SOLUTION INTRAVENOUS CONTINUOUS PRN
Status: DISCONTINUED | OUTPATIENT
Start: 2025-08-28 | End: 2025-08-28 | Stop reason: SURG

## 2025-08-28 RX ORDER — HYDROCODONE BITARTRATE AND ACETAMINOPHEN 5; 325 MG/1; MG/1
1 TABLET ORAL ONCE AS NEEDED
Status: DISCONTINUED | OUTPATIENT
Start: 2025-08-28 | End: 2025-08-28 | Stop reason: HOSPADM

## 2025-08-28 RX ORDER — NALOXONE HYDROCHLORIDE 0.4 MG/ML
0.08 INJECTION, SOLUTION INTRAMUSCULAR; INTRAVENOUS; SUBCUTANEOUS AS NEEDED
Status: DISCONTINUED | OUTPATIENT
Start: 2025-08-28 | End: 2025-08-28 | Stop reason: HOSPADM

## 2025-08-28 RX ORDER — HYDROCODONE BITARTRATE AND ACETAMINOPHEN 5; 325 MG/1; MG/1
2 TABLET ORAL ONCE AS NEEDED
Status: DISCONTINUED | OUTPATIENT
Start: 2025-08-28 | End: 2025-08-28 | Stop reason: HOSPADM

## 2025-08-28 RX ORDER — LIDOCAINE HYDROCHLORIDE 10 MG/ML
INJECTION, SOLUTION EPIDURAL; INFILTRATION; INTRACAUDAL; PERINEURAL AS NEEDED
Status: DISCONTINUED | OUTPATIENT
Start: 2025-08-28 | End: 2025-08-28 | Stop reason: SURG

## 2025-08-28 RX ORDER — ACETAMINOPHEN 500 MG
1000 TABLET ORAL ONCE AS NEEDED
Status: DISCONTINUED | OUTPATIENT
Start: 2025-08-28 | End: 2025-08-28 | Stop reason: HOSPADM

## 2025-08-28 RX ORDER — PHENYLEPHRINE HCL 10 MG/ML
VIAL (ML) INJECTION AS NEEDED
Status: DISCONTINUED | OUTPATIENT
Start: 2025-08-28 | End: 2025-08-28 | Stop reason: SURG

## 2025-08-28 RX ORDER — ONDANSETRON 2 MG/ML
4 INJECTION INTRAMUSCULAR; INTRAVENOUS EVERY 6 HOURS PRN
Status: DISCONTINUED | OUTPATIENT
Start: 2025-08-28 | End: 2025-08-28 | Stop reason: HOSPADM

## 2025-08-28 RX ORDER — LIDOCAINE HYDROCHLORIDE AND EPINEPHRINE 10; 10 MG/ML; UG/ML
INJECTION, SOLUTION INFILTRATION; PERINEURAL AS NEEDED
Status: DISCONTINUED | OUTPATIENT
Start: 2025-08-28 | End: 2025-08-28 | Stop reason: HOSPADM

## 2025-08-28 RX ORDER — DIPHENHYDRAMINE HYDROCHLORIDE 50 MG/ML
12.5 INJECTION, SOLUTION INTRAMUSCULAR; INTRAVENOUS AS NEEDED
Status: DISCONTINUED | OUTPATIENT
Start: 2025-08-28 | End: 2025-08-28 | Stop reason: HOSPADM

## 2025-08-28 RX ORDER — ONDANSETRON 2 MG/ML
INJECTION INTRAMUSCULAR; INTRAVENOUS AS NEEDED
Status: DISCONTINUED | OUTPATIENT
Start: 2025-08-28 | End: 2025-08-28 | Stop reason: SURG

## 2025-08-28 RX ORDER — SODIUM CHLORIDE, SODIUM LACTATE, POTASSIUM CHLORIDE, CALCIUM CHLORIDE 600; 310; 30; 20 MG/100ML; MG/100ML; MG/100ML; MG/100ML
INJECTION, SOLUTION INTRAVENOUS CONTINUOUS
Status: DISCONTINUED | OUTPATIENT
Start: 2025-08-28 | End: 2025-08-28 | Stop reason: HOSPADM

## 2025-08-28 RX ORDER — HYDROMORPHONE HYDROCHLORIDE 1 MG/ML
0.2 INJECTION, SOLUTION INTRAMUSCULAR; INTRAVENOUS; SUBCUTANEOUS EVERY 5 MIN PRN
Status: DISCONTINUED | OUTPATIENT
Start: 2025-08-28 | End: 2025-08-28 | Stop reason: HOSPADM

## 2025-08-28 RX ORDER — OXYCODONE AND ACETAMINOPHEN 5; 325 MG/1; MG/1
1 TABLET ORAL EVERY 4 HOURS PRN
Status: DISCONTINUED | OUTPATIENT
Start: 2025-08-28 | End: 2025-08-29

## 2025-08-28 RX ORDER — TRANEXAMIC ACID 10 MG/ML
INJECTION, SOLUTION INTRAVENOUS AS NEEDED
Status: DISCONTINUED | OUTPATIENT
Start: 2025-08-28 | End: 2025-08-28 | Stop reason: SURG

## 2025-08-28 RX ORDER — DEXAMETHASONE SODIUM PHOSPHATE 4 MG/ML
VIAL (ML) INJECTION AS NEEDED
Status: DISCONTINUED | OUTPATIENT
Start: 2025-08-28 | End: 2025-08-28 | Stop reason: SURG

## 2025-08-28 RX ORDER — CEFAZOLIN SODIUM 1 G/3ML
INJECTION, POWDER, FOR SOLUTION INTRAMUSCULAR; INTRAVENOUS AS NEEDED
Status: DISCONTINUED | OUTPATIENT
Start: 2025-08-28 | End: 2025-08-28 | Stop reason: SURG

## 2025-08-28 RX ORDER — HYDROMORPHONE HYDROCHLORIDE 1 MG/ML
INJECTION, SOLUTION INTRAMUSCULAR; INTRAVENOUS; SUBCUTANEOUS
Status: COMPLETED
Start: 2025-08-28 | End: 2025-08-28

## 2025-08-28 RX ORDER — GLYCOPYRROLATE 0.2 MG/ML
INJECTION, SOLUTION INTRAMUSCULAR; INTRAVENOUS AS NEEDED
Status: DISCONTINUED | OUTPATIENT
Start: 2025-08-28 | End: 2025-08-28 | Stop reason: SURG

## 2025-08-28 RX ORDER — BUPIVACAINE HYDROCHLORIDE AND EPINEPHRINE 2.5; 5 MG/ML; UG/ML
INJECTION, SOLUTION EPIDURAL; INFILTRATION; INTRACAUDAL; PERINEURAL AS NEEDED
Status: DISCONTINUED | OUTPATIENT
Start: 2025-08-28 | End: 2025-08-28 | Stop reason: SURG

## 2025-08-28 RX ADMIN — BUPIVACAINE HYDROCHLORIDE AND EPINEPHRINE 30 ML: 2.5; 5 INJECTION, SOLUTION EPIDURAL; INFILTRATION; INTRACAUDAL; PERINEURAL at 13:01:00

## 2025-08-28 RX ADMIN — SODIUM CHLORIDE, SODIUM LACTATE, POTASSIUM CHLORIDE, CALCIUM CHLORIDE: 600; 310; 30; 20 INJECTION, SOLUTION INTRAVENOUS at 14:30:00

## 2025-08-28 RX ADMIN — DEXAMETHASONE SODIUM PHOSPHATE 4 MG: 4 MG/ML VIAL (ML) INJECTION at 13:13:00

## 2025-08-28 RX ADMIN — PHENYLEPHRINE HCL 100 MCG: 10 MG/ML VIAL (ML) INJECTION at 14:23:00

## 2025-08-28 RX ADMIN — TRANEXAMIC ACID 1000 MG: 10 INJECTION, SOLUTION INTRAVENOUS at 13:06:00

## 2025-08-28 RX ADMIN — ONDANSETRON 4 MG: 2 INJECTION INTRAMUSCULAR; INTRAVENOUS at 14:28:00

## 2025-08-28 RX ADMIN — SODIUM CHLORIDE, SODIUM LACTATE, POTASSIUM CHLORIDE, CALCIUM CHLORIDE: 600; 310; 30; 20 INJECTION, SOLUTION INTRAVENOUS at 12:52:00

## 2025-08-28 RX ADMIN — LIDOCAINE HYDROCHLORIDE 50 MG: 10 INJECTION, SOLUTION EPIDURAL; INFILTRATION; INTRACAUDAL; PERINEURAL at 12:53:00

## 2025-08-28 RX ADMIN — NEOSTIGMINE METHYLSULFATE 3 MG: 1 INJECTION INTRAVENOUS at 14:41:00

## 2025-08-28 RX ADMIN — CEFAZOLIN SODIUM 2 G: 1 INJECTION, POWDER, FOR SOLUTION INTRAMUSCULAR; INTRAVENOUS at 13:04:00

## 2025-08-28 RX ADMIN — GLYCOPYRROLATE 0.4 MG: 0.2 INJECTION, SOLUTION INTRAMUSCULAR; INTRAVENOUS at 14:41:00

## 2025-08-28 RX ADMIN — ROCURONIUM BROMIDE 50 MG: 10 INJECTION, SOLUTION INTRAVENOUS at 12:53:00

## 2025-08-29 ENCOUNTER — APPOINTMENT (OUTPATIENT)
Facility: HOSPITAL | Age: 25
End: 2025-08-29
Attending: INTERNAL MEDICINE

## 2025-08-29 VITALS
BODY MASS INDEX: 21 KG/M2 | HEART RATE: 83 BPM | SYSTOLIC BLOOD PRESSURE: 96 MMHG | WEIGHT: 120.19 LBS | OXYGEN SATURATION: 96 % | RESPIRATION RATE: 16 BRPM | TEMPERATURE: 98 F | DIASTOLIC BLOOD PRESSURE: 54 MMHG

## 2025-08-29 LAB
ANION GAP SERPL CALC-SCNC: 10 MMOL/L (ref 0–18)
ATRIAL RATE: 86 BPM
BASOPHILS # BLD AUTO: 0.03 X10(3) UL (ref 0–0.2)
BASOPHILS NFR BLD AUTO: 0.4 %
BUN BLD-MCNC: 8 MG/DL (ref 9–23)
CALCIUM BLD-MCNC: 9 MG/DL (ref 8.7–10.6)
CHLORIDE SERPL-SCNC: 106 MMOL/L (ref 98–112)
CO2 SERPL-SCNC: 27 MMOL/L (ref 21–32)
CREAT BLD-MCNC: 0.55 MG/DL (ref 0.55–1.02)
EGFRCR SERPLBLD CKD-EPI 2021: 133 ML/MIN/1.73M2 (ref 60–?)
EOSINOPHIL # BLD AUTO: 0.11 X10(3) UL (ref 0–0.7)
EOSINOPHIL NFR BLD AUTO: 1.5 %
ERYTHROCYTE [DISTWIDTH] IN BLOOD BY AUTOMATED COUNT: 12.4 %
GLUCOSE BLD-MCNC: 95 MG/DL (ref 70–99)
HCT VFR BLD AUTO: 28.8 % (ref 35–48)
HGB BLD-MCNC: 9.4 G/DL (ref 12–16)
IMM GRANULOCYTES # BLD AUTO: 0.02 X10(3) UL (ref 0–1)
IMM GRANULOCYTES NFR BLD: 0.3 %
LYMPHOCYTES # BLD AUTO: 2.11 X10(3) UL (ref 1–4)
LYMPHOCYTES NFR BLD AUTO: 28.6 %
MAGNESIUM SERPL-MCNC: 1.9 MG/DL (ref 1.6–2.6)
MCH RBC QN AUTO: 30 PG (ref 26–34)
MCHC RBC AUTO-ENTMCNC: 32.6 G/DL (ref 31–37)
MCV RBC AUTO: 92 FL (ref 80–100)
MONOCYTES # BLD AUTO: 0.53 X10(3) UL (ref 0.1–1)
MONOCYTES NFR BLD AUTO: 7.2 %
NEUTROPHILS # BLD AUTO: 4.57 X10 (3) UL (ref 1.5–7.7)
NEUTROPHILS # BLD AUTO: 4.57 X10(3) UL (ref 1.5–7.7)
NEUTROPHILS NFR BLD AUTO: 62 %
OSMOLALITY SERPL CALC.SUM OF ELEC: 294 MOSM/KG (ref 275–295)
P AXIS: 44 DEGREES
P-R INTERVAL: 160 MS
PLATELET # BLD AUTO: 234 10(3)UL (ref 150–450)
POTASSIUM SERPL-SCNC: 4 MMOL/L (ref 3.5–5.1)
Q-T INTERVAL: 380 MS
QRS DURATION: 84 MS
QTC CALCULATION (BEZET): 454 MS
R AXIS: 5 DEGREES
RBC # BLD AUTO: 3.13 X10(6)UL (ref 3.8–5.3)
SODIUM SERPL-SCNC: 143 MMOL/L (ref 136–145)
T AXIS: 16 DEGREES
TROPONIN I SERPL HS-MCNC: <3 NG/L (ref ?–34)
VENTRICULAR RATE: 86 BPM
WBC # BLD AUTO: 7.4 X10(3) UL (ref 4–11)

## 2025-08-29 PROCEDURE — 83735 ASSAY OF MAGNESIUM: CPT | Performed by: PLASTIC SURGERY

## 2025-08-29 PROCEDURE — 84484 ASSAY OF TROPONIN QUANT: CPT | Performed by: HOSPITALIST

## 2025-08-29 PROCEDURE — 36410 VNPNXR 3YR/> PHY/QHP DX/THER: CPT

## 2025-08-29 PROCEDURE — 93005 ELECTROCARDIOGRAM TRACING: CPT

## 2025-08-29 PROCEDURE — 80048 BASIC METABOLIC PNL TOTAL CA: CPT | Performed by: PLASTIC SURGERY

## 2025-08-29 PROCEDURE — 93010 ELECTROCARDIOGRAM REPORT: CPT | Performed by: INTERNAL MEDICINE

## 2025-08-29 PROCEDURE — 85025 COMPLETE CBC W/AUTO DIFF WBC: CPT | Performed by: PLASTIC SURGERY

## 2025-08-29 RX ORDER — ALPRAZOLAM 0.25 MG
0.25 TABLET ORAL 3 TIMES DAILY PRN
Status: DISCONTINUED | OUTPATIENT
Start: 2025-08-29 | End: 2025-08-29

## 2025-08-29 RX ORDER — ENOXAPARIN SODIUM 100 MG/ML
40 INJECTION SUBCUTANEOUS NIGHTLY
Status: DISCONTINUED | OUTPATIENT
Start: 2025-08-29 | End: 2025-08-29

## 2025-08-29 RX ORDER — CEFEPIME 1 G/50ML
2 INJECTION, SOLUTION INTRAVENOUS EVERY 8 HOURS
Qty: 3900 ML | Refills: 0 | Status: SHIPPED | OUTPATIENT
Start: 2025-08-29 | End: 2025-09-11

## 2025-08-29 RX ORDER — OXYCODONE AND ACETAMINOPHEN 5; 325 MG/1; MG/1
1-2 TABLET ORAL EVERY 4 HOURS PRN
Qty: 20 TABLET | Refills: 0 | Status: SHIPPED | OUTPATIENT
Start: 2025-08-29

## (undated) DEVICE — CLEAR MONOFILAMENT (POLYDIOXANONE), ABSORBABLE SURGICAL SUTURE: Brand: PDS

## (undated) DEVICE — INSULATED BLADE ELECTRODE: Brand: EDGE

## (undated) DEVICE — AEGIS 1" DISK 4MM HOLE, PEEL OPEN: Brand: MEDLINE

## (undated) DEVICE — SOLUTION IRRIG 1000ML 0.9% NACL USP BTL

## (undated) DEVICE — Device

## (undated) DEVICE — SUT MCRYL 3-0 27IN ABSRB UD 19MM PS-2 3/8

## (undated) DEVICE — SUT ETHLN 3-0 18IN PS-1 NABSRB BLK 24MM 3/8 C

## (undated) DEVICE — MARKER SKIN PREP RESIST STRL

## (undated) DEVICE — PACK CDS PLASTIC BREAST

## (undated) DEVICE — SYRINGE MED 50ML LL TIP DISP

## (undated) DEVICE — DRAIN SUR 15FR L3/16IN DIA4.7MM SIL RND

## (undated) DEVICE — GLOVE SUR 6 SENSICARE PI PIP CRM PWD F

## (undated) DEVICE — LAPAROTOMY SPONGE - RF AND X-RAY DETECTABLE PRE-WASHED: Brand: SITUATE

## (undated) DEVICE — SOLUTION PREP 4OZ 10% POVIDONE IOD SCR TOP

## (undated) DEVICE — SUT COAT VCRL 2-0 27IN SH ABSRB UD 26MM 1/2

## (undated) DEVICE — BREAST-HERNIA-PORT CDS-LF: Brand: MEDLINE INDUSTRIES, INC.

## (undated) DEVICE — SUT VCRL 3-0 18IN PS-2 ABSRB UD L19MM 3/8 CIR

## (undated) DEVICE — GLOVE SUR 8 SENSICARE PI PIP CRM PWD F

## (undated) DEVICE — GOWN,SIRUS,FABRNF,L,20/CS: Brand: MEDLINE

## (undated) DEVICE — 3M™ TEGADERM™ TRANSPARENT FILM DRESSING FRAME STYLE, 1624W, 2-3/8 IN X 2-3/4 IN (6 CM X 7 CM), 100/CT 4CT/CASE: Brand: 3M™ TEGADERM™

## (undated) DEVICE — WECK HORIZON MED BLUE CLIP DISP

## (undated) DEVICE — 40580 - THE PINK PAD - ADVANCED TRENDELENBURG POSITIONING KIT: Brand: 40580 - THE PINK PAD - ADVANCED TRENDELENBURG POSITIONING KIT

## (undated) DEVICE — TRAY CATH 16FR F INCL BARDX IC COMPLT CARE

## (undated) DEVICE — LIGASURE EXACT DISSECTOR: Brand: LIGASURE

## (undated) DEVICE — DRESSING FOAM 1IN 4MM H DISK CHG IMPREG AEGIS

## (undated) DEVICE — DRAIN SUR W10MMXL20CM SIL FULL PERF HUBLESS

## (undated) DEVICE — PROXIMATE RH ROTATING HEAD SKIN STAPLERS (35 WIDE) CONTAINS 35 STAINLESS STEEL STAPLES: Brand: PROXIMATE

## (undated) DEVICE — 450 ML BOTTLE OF 0.05% CHLORHEXIDINE GLUCONATE IN 99.95% STERILE WATER FOR IRRIGATION, USP AND APPLICATOR.: Brand: IRRISEPT ANTIMICROBIAL WOUND LAVAGE

## (undated) DEVICE — GAUZE,SPONGE,FLUFF,6"X6.75",STRL,5/TRAY: Brand: MEDLINE

## (undated) DEVICE — SLEEVE COMPR MD KNEE LEN SGL USE KENDALL SCD

## (undated) DEVICE — EVACUATOR SUR 100CC SIL BLB WND

## (undated) DEVICE — BLADE ELECTRODE: Brand: EDGE

## (undated) DEVICE — SOLUTION SET, MALE LUER LOCK ADAPTER

## (undated) DEVICE — GLOVE SUR 7 PROTEXIS PI PIP CRM PWD F

## (undated) DEVICE — STAPLER SKIN ROTICULATING PRW3

## (undated) DEVICE — MARKER SKIN PREP-RESISTANT ST: Brand: MEDLINE INDUSTRIES, INC.

## (undated) DEVICE — 3M™ IOBAN™ 2 ANTIMICROBIAL INCISE DRAPE 6648EZ: Brand: IOBAN™ 2

## (undated) DEVICE — SHEET,DRAPE,40X58,STERILE: Brand: MEDLINE

## (undated) DEVICE — GOWN SUR 2XL LEV 4 BLU W/ WHT V NK BND AERO

## (undated) DEVICE — 4-WAY HIGH FLOW STOPCOCK W/ROTATING LUER: Brand: ICU MEDICAL

## (undated) DEVICE — E-Z CLEAN, NON-STICK, PTFE COATED, ELECTROSURGICAL BLADE ELECTRODE, MODIFIED EXTENDED INSULATION, 6.5 INCH (16.5 CM): Brand: MEGADYNE

## (undated) DEVICE — SUT PROL 3-0 18IN PS-1 NABSRB BLU L24MM 3/8 C

## (undated) DEVICE — SUT PROL 3-0 36IN SH NABSRB BLU 26MM 1/2 CIR

## (undated) DEVICE — COVER,LIGHT,CAMERA,HARD,1/PK,STRL: Brand: MEDLINE

## (undated) DEVICE — SUT PDS II 0 L27IN ABSRB VLT L26MM CT-2

## (undated) DEVICE — SUT PLN GUT 5-0 18IN PC-1 ABSRB TAN YELLOWISH

## (undated) DEVICE — ANTIBACTERIAL UNDYED BRAIDED (POLYGLACTIN 910), SYNTHETIC ABSORBABLE SUTURE: Brand: COATED VICRYL

## (undated) DEVICE — EXOFIN PRECISION PEN HIGH VISCOSITY TOPICAL SKIN ADHESIVE: Brand: EXOFIN PRECISION PEN, 1G

## (undated) NOTE — LETTER
[address]  [address]  Authorization for Surgical Operation and Procedure     Date:___________                                                                                                         Time:__________  I hereby authorize Surgeon(s):  Bobby Loredo MD Polyatskaya, Yekaterina, MD, my physician and his/her assistants (if applicable), which may include medical students, residents, and/or fellows, to perform the following surgical operation/ procedure and administer such anesthesia as may be determined necessary by my physician:  Operation/Procedure name (s) Procedure(s):  BILATERAL NIPPLE SPARING MASTECTOMY ([place]) BILATERAL SUBPECTORAL TISSUE EXPANDER PLACEMENT WITH BILATERAL PLACEMENT OF ALLODERM ([place])  . on Nellie Viramontes   2.   I recognize that during the surgical operation/procedure, unforeseen conditions may necessitate additional or different procedures than those listed above.  I, therefore, further authorize and request that the above-named surgeon, assistants, or designees perform such procedures as are, in their judgment, necessary and desirable.    3.   My surgeon/physician has discussed prior to my surgery the potential benefits, risks and side effects of this procedure; the likelihood of achieving goals; and potential problems that might occur during recuperation.  They also discussed reasonable alternatives to the procedure, including risks, benefits, and side effects related to the alternatives and risks related to not receiving this procedure.  I have had all my questions answered and I acknowledge that no guarantee has been made as to the result that may be obtained.    4.   Should the need arise during my operation/procedure, which includes change of level of care prior to discharge, I also consent to the administration of blood and/or blood products.  Further, I understand that despite careful testing and screening of blood  or blood products by collecting agencies, I may still be subject to ill effects as a result of receiving a blood transfusion and/or blood products.  The following are some, but not all, of the potential risks that can occur: fever and allergic reactions, hemolytic reactions, transmission of diseases such as Hepatitis, AIDS and Cytomegalovirus (CMV) and fluid overload.  In the event that I wish to have an autologous transfusion of my own blood, or a directed donor transfusion, I will discuss this with my physician.  Check only if Refusing Blood or Blood Products  I understand refusal of blood or blood products as deemed necessary by my physician may have serious consequences to my condition to include possible death. I hereby assume responsibility for my refusal and release the hospital, its personnel, and my physicians from any responsibility for the consequences of my refusal.          o  Refuse      5.   I authorize the use of any specimen, organs, tissues, body parts or foreign objects that may be removed from my body during the operation/procedure for diagnosis, research or teaching purposes and their subsequent disposal by hospital authorities.  I also authorize the release of specimen test results and/or written reports to my treating physician on the hospital medical staff or other referring or consulting physicians involved in my care, at the discretion of the Pathologist or my treating physician.    6.   I consent to the photographing or videotaping of the operations or procedures to be performed, including appropriate portions of my body for medical, scientific, or educational purposes, provided my identity is not revealed by the pictures or by descriptive texts accompanying them.  If the procedure has been photographed/videotaped, the surgeon will obtain the original picture, image, videotape or CD.  The hospital will not be responsible for storage, release or maintenance of the picture, image, tape or  CD.    7.   I consent to the presence of a  or observers in the operating room as deemed necessary by my physician or their designees.    8.   I recognize that in the event my procedure results in extended X-Ray/fluoroscopy time, I may develop a skin reaction.    9. If I have a Do Not Attempt Resuscitation (DNAR) order in place, that status will be suspended while in the operating room, procedural suite, and during the recovery period unless otherwise explicitly stated by me (or a person authorized to consent on my behalf). The surgeon or my attending physician will determine when the applicable recovery period ends for purposes of reinstating the DNAR order.  10. Patients having a sterilization procedure: I understand that if the procedure is successful the results will be permanent and it will therefore be impossible for me to inseminate, conceive, or bear children.  I also understand that the procedure is intended to result in sterility, although the result has not been guaranteed.   11. I acknowledge that my physician has explained sedation/analgesia administration to me including the risk and benefits I consent to the administration of sedation/analgesia as may be necessary or desirable in the judgment of my physician.    I CERTIFY THAT I HAVE READ AND FULLY UNDERSTAND THE ABOVE CONSENT TO OPERATION and/or OTHER PROCEDURE.    _________________________________________  __________________________________  Signature of Patient     Signature of Responsible Person         ___________________________________         Printed Name of Responsible Person           _________________________________                 Relationship to Patient  _________________________________________  ______________________________  Signature of Witness          Date  Time      Patient Name: Nellie Viramontes     : 10/28/2000                 Printed: 2025     Medical Record #: [id number]                      Page 2 of 3                                    [address]  [address]    Consent for Anesthesia    I, Nellie Viramontes agree to be cared for by an anesthesiologist, who is specially trained to monitor me and give me medicine to put me to sleep or keep me comfortable during my procedure    I understand that my anesthesiologist is not an employee or agent of [facility] or [employer]. He or she works for [employer].    As the patient asking for anesthesia services, I agree to:  Allow the anesthesiologist (anesthesia doctor) to give me medicine and do additional procedures as necessary. Some examples are: Starting or using an “IV” to give me medicine, fluids or blood during my procedure, and having a breathing tube placed to help me breathe when I’m asleep (intubation). In the event that my heart stops working properly, I understand that my anesthesiologist will make every effort to sustain my life, unless otherwise directed by [facility] Do Not Resuscitate documents.  Tell my anesthesia doctor before my procedure:  If I am pregnant.  The last time that I ate or drank.  All of the medicines I take (including prescriptions, herbal supplements, and pills I can buy without a prescription (including street drugs/illegal medications). Failure to inform my anesthesiologist about these medicines may increase my risk of anesthetic complications.  If I am allergic to anything or have had a reaction to anesthesia before.  I understand how the anesthesia medicine will help me (benefits).  I understand that with any type of anesthesia medicine there are risks:  The most common risks are: nausea, vomiting, sore throat, muscle soreness, damage to my eyes, mouth, or teeth (from breathing tube placement).  Rare risks include: remembering what happened during my procedure, allergic reactions to medications, injury to my airway, heart, lungs, vision,  nerves, or muscles and in extremely rare instances death.  My doctor has explained to me other choices available to me for my care (alternatives).  Pregnant Patients (“epidural”):  I understand that the risks of having an epidural (medicine given into my back to help control pain during labor), include itching, low blood pressure, difficulty urinating, headache or slowing of the baby’s heart. Very rare risks include infection, bleeding, seizure, irregular heart rhythms and nerve injury.  Regional Anesthesia (“spinal”, “epidural”, & “nerve blocks”):  I understand that rare but potential complications include headache, bleeding, infection, seizure, irregular heart rhythms, and nerve injury.    I can change my mind about having anesthesia services at any time before I get the medicine.    _____________________________________________________________________________  Patient (or Representative) Signature/Relationship to Patient  Date   Time    _____________________________________________________________________________   Name (if used)    Language/Organization   Time    _____________________________________________________________________________  Anesthesiologist Signature     Date   Time  I have discussed the procedure and information above with the patient (or patient’s representative) and answered their questions. The patient or their representative has agreed to have anesthesia services.    _____________________________________________________________________________  Witness        Date   Time  I have verified that the signature is that of the patient or patient’s representative, and that it was signed before the procedure  Patient Name: Nellie Viramotnes     : 10/28/2000                 Printed: 2025     Medical Record #: [id number]                     Page 3 of 3

## (undated) NOTE — ED AVS SNAPSHOT
Torie Lopez   MRN: [id number]    Department:  [facility] Emergency Department   Date of Visit:  5/12/2019           Disclosure     Insurance plans vary and the physician(s) referred by the ER may not be covered by your plan.  Please contact yo tell this physician (or your personal doctor if your instructions are to return to your personal doctor) about any new or lasting problems. The primary care or specialist physician will see patients referred from the [facility] Emergency Department.  Janine Segovia

## (undated) NOTE — LETTER
[facility] IN [place]  [address] [id number]  Dept: [phone]  Dept Fax: [phone]      September 29, 2017    Patient: Ky Brood   Date of Visit: 9/29/2017       To Whom It May Concern:    Theresa Edge was seen

## (undated) NOTE — ED AVS SNAPSHOT
[facility] in [address] [place] [address] Drive,[address]    [address] [facility]    Phone:  [phone]    Fax:  Õiterence 16   MRN: [id number]    Department:  [facility] in [facility]   Date of Visit:  5/19/2017 Si usted tiene algun problema con velazquez sequimiento, por favor llame a nuestro adminstrador de rachaelos al [phone]. Expect to receive an electronic request (by e-mail or text) to complete a self-assessment the day after your visit.   You may also recei Kenneth Yeni Waluriah [address] [facility] ([address]) [address] Jenae Harley. ([address] [facility]) [address]  ([address]) [address] [facility] MyChart Questions? Call [phone] for help. Banjo is NOT to be used for urgent needs. For medical emergencies, dial 911.